# Patient Record
Sex: FEMALE | Race: BLACK OR AFRICAN AMERICAN | Employment: OTHER | URBAN - METROPOLITAN AREA
[De-identification: names, ages, dates, MRNs, and addresses within clinical notes are randomized per-mention and may not be internally consistent; named-entity substitution may affect disease eponyms.]

---

## 2018-02-21 ENCOUNTER — ED HISTORICAL/CONVERTED ENCOUNTER (OUTPATIENT)
Dept: OTHER | Age: 73
End: 2018-02-21

## 2018-08-26 ENCOUNTER — ED HISTORICAL/CONVERTED ENCOUNTER (OUTPATIENT)
Dept: OTHER | Age: 73
End: 2018-08-26

## 2019-02-15 ENCOUNTER — ED HISTORICAL/CONVERTED ENCOUNTER (OUTPATIENT)
Dept: OTHER | Age: 74
End: 2019-02-15

## 2019-03-18 ENCOUNTER — OP HISTORICAL/CONVERTED ENCOUNTER (OUTPATIENT)
Dept: OTHER | Age: 74
End: 2019-03-18

## 2019-04-19 ENCOUNTER — OP HISTORICAL/CONVERTED ENCOUNTER (OUTPATIENT)
Dept: OTHER | Age: 74
End: 2019-04-19

## 2019-08-01 ENCOUNTER — OP HISTORICAL/CONVERTED ENCOUNTER (OUTPATIENT)
Dept: OTHER | Age: 74
End: 2019-08-01

## 2022-08-29 ENCOUNTER — TRANSCRIBE ORDER (OUTPATIENT)
Dept: SCHEDULING | Age: 77
End: 2022-08-29

## 2022-08-29 DIAGNOSIS — R42 DIZZINESS: Primary | ICD-10-CM

## 2022-08-29 DIAGNOSIS — R06.02 SOB (SHORTNESS OF BREATH): ICD-10-CM

## 2022-09-02 ENCOUNTER — HOSPITAL ENCOUNTER (OUTPATIENT)
Dept: VASCULAR SURGERY | Age: 77
Discharge: HOME | End: 2022-09-02
Attending: NURSE PRACTITIONER
Payer: MEDICARE

## 2022-09-02 DIAGNOSIS — R42 DIZZINESS: ICD-10-CM

## 2022-09-02 DIAGNOSIS — R06.02 SOB (SHORTNESS OF BREATH): ICD-10-CM

## 2022-09-02 LAB
ECHO AO ROOT DIAM: 3 CM
ECHO AV AREA PEAK VELOCITY: 2.8 CM2
ECHO AV AREA VTI: 2.7 CM2
ECHO AV MEAN GRADIENT: 3 MMHG
ECHO AV MEAN VELOCITY: 0.8 M/S
ECHO AV PEAK GRADIENT: 5 MMHG
ECHO AV PEAK VELOCITY: 1.1 M/S
ECHO AV VELOCITY RATIO: 0.91
ECHO AV VTI: 27.1 CM
ECHO LA DIAMETER: 2.8 CM
ECHO LA TO AORTIC ROOT RATIO: 0.93
ECHO LA VOL 2C: 30 ML (ref 22–52)
ECHO LA VOL 4C: 38 ML (ref 22–52)
ECHO LA VOL BP: 36 ML (ref 22–52)
ECHO LA VOLUME AREA LENGTH: 41 ML
ECHO LV EJECTION FRACTION A2C: 65 %
ECHO LV EJECTION FRACTION A4C: 69 %
ECHO LV FRACTIONAL SHORTENING: 42 % (ref 28–44)
ECHO LV GLOBAL LONGITUDINAL STRAIN (GLS): -17.4 %
ECHO LV INTERNAL DIMENSION DIASTOLIC: 4.5 CM (ref 3.9–5.3)
ECHO LV INTERNAL DIMENSION SYSTOLIC: 2.6 CM
ECHO LV IVSD: 0.9 CM (ref 0.6–0.9)
ECHO LV MASS 2D: 132.8 G (ref 67–162)
ECHO LV POSTERIOR WALL DIASTOLIC: 0.9 CM (ref 0.6–0.9)
ECHO LV RELATIVE WALL THICKNESS RATIO: 0.4
ECHO LVOT AREA: 3.1 CM2
ECHO LVOT AV VTI INDEX: 0.83
ECHO LVOT CARDIAC OUTPUT: 2.9 LITER/MINUTE
ECHO LVOT DIAM: 2 CM
ECHO LVOT MEAN GRADIENT: 2 MMHG
ECHO LVOT PEAK GRADIENT: 4 MMHG
ECHO LVOT PEAK VELOCITY: 1 M/S
ECHO LVOT SV: 71 ML
ECHO LVOT VTI: 22.6 CM
ECHO MV A VELOCITY: 0.81 M/S
ECHO MV E DECELERATION TIME (DT): 234.4 MS
ECHO MV E VELOCITY: 0.73 M/S
ECHO MV E/A RATIO: 0.9
ECHO RV INTERNAL DIMENSION: 2.1 CM
ECHO RV TAPSE: 1.9 CM (ref 1.7–?)
ECHO TV REGURGITANT MAX VELOCITY: 1.86 M/S
ECHO TV REGURGITANT PEAK GRADIENT: 14 MMHG
LEFT CCA DIST DIAS: 25.9 CM/S
LEFT CCA DIST SYS: 81.2 CM/S
LEFT CCA PROX DIAS: 23.3 CM/S
LEFT CCA PROX SYS: 83.1 CM/S
LEFT ECA DIAS: 7.07 CM/S
LEFT ECA SYS: 46.9 CM/S
LEFT ICA DIST DIAS: 35.5 CM/S
LEFT ICA DIST SYS: 94.3 CM/S
LEFT ICA MID DIAS: 24.3 CM/S
LEFT ICA MID SYS: 80.2 CM/S
LEFT ICA PROX DIAS: 17.7 CM/S
LEFT ICA PROX SYS: 69.5 CM/S
LEFT ICA/CCA SYS: 1.16 NO UNITS
LEFT VERTEBRAL DIAS: 27.01 CM/S
LEFT VERTEBRAL SYS: 72.4 CM/S
RIGHT CCA DIST DIAS: 18.4 CM/S
RIGHT CCA DIST SYS: 83.8 CM/S
RIGHT CCA PROX DIAS: 21.6 CM/S
RIGHT CCA PROX SYS: 93.8 CM/S
RIGHT ECA DIAS: 9.03 CM/S
RIGHT ECA SYS: 56.3 CM/S
RIGHT ICA DIST DIAS: 24.1 CM/S
RIGHT ICA DIST SYS: 60.8 CM/S
RIGHT ICA MID DIAS: 20.2 CM/S
RIGHT ICA MID SYS: 55.5 CM/S
RIGHT ICA PROX DIAS: 17.7 CM/S
RIGHT ICA PROX SYS: 70.2 CM/S
RIGHT ICA/CCA SYS: 0.8 NO UNITS
RIGHT VERTEBRAL DIAS: 13.63 CM/S
RIGHT VERTEBRAL SYS: 46.9 CM/S
VAS LEFT SUBCLAVIAN PROX EDV: 0 CM/S
VAS LEFT SUBCLAVIAN PROX PSV: 69.7 CM/S
VAS RIGHT SUBCLAVIAN PROX EDV: 0 CM/S
VAS RIGHT SUBCLAVIAN PROX PSV: 92.1 CM/S

## 2022-09-02 PROCEDURE — 93306 TTE W/DOPPLER COMPLETE: CPT

## 2022-09-02 PROCEDURE — 93880 EXTRACRANIAL BILAT STUDY: CPT

## 2022-10-25 ENCOUNTER — APPOINTMENT (OUTPATIENT)
Dept: CT IMAGING | Age: 77
End: 2022-10-25
Attending: EMERGENCY MEDICINE
Payer: MEDICARE

## 2022-10-25 ENCOUNTER — HOSPITAL ENCOUNTER (EMERGENCY)
Age: 77
Discharge: ANOTHER HOSPITAL | End: 2022-10-25
Attending: EMERGENCY MEDICINE
Payer: MEDICARE

## 2022-10-25 VITALS
TEMPERATURE: 98 F | HEART RATE: 52 BPM | BODY MASS INDEX: 25.91 KG/M2 | OXYGEN SATURATION: 97 % | RESPIRATION RATE: 16 BRPM | DIASTOLIC BLOOD PRESSURE: 83 MMHG | HEIGHT: 60 IN | WEIGHT: 132 LBS | SYSTOLIC BLOOD PRESSURE: 163 MMHG

## 2022-10-25 DIAGNOSIS — S36.119A: ICD-10-CM

## 2022-10-25 DIAGNOSIS — S22.000D CLOSED COMPRESSION FRACTURE OF THORACIC VERTEBRA WITH ROUTINE HEALING, SUBSEQUENT ENCOUNTER: ICD-10-CM

## 2022-10-25 DIAGNOSIS — S30.0XXA PELVIC CONTUSION, INITIAL ENCOUNTER: ICD-10-CM

## 2022-10-25 DIAGNOSIS — S39.91XA BLUNT ABDOMINAL TRAUMA, INITIAL ENCOUNTER: ICD-10-CM

## 2022-10-25 DIAGNOSIS — S16.1XXA STRAIN OF NECK MUSCLE, INITIAL ENCOUNTER: ICD-10-CM

## 2022-10-25 DIAGNOSIS — S22.41XA CLOSED FRACTURE OF MULTIPLE RIBS OF RIGHT SIDE, INITIAL ENCOUNTER: ICD-10-CM
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
## 2022-10-25 LAB
ALBUMIN SERPL-MCNC: 3.1 G/DL (ref 3.4–5)
ALBUMIN/GLOB SERPL: 0.9 {RATIO} (ref 0.8–1.7)
ALP SERPL-CCNC: 69 U/L (ref 45–117)
ALT SERPL-CCNC: 73 U/L (ref 13–56)
ANION GAP SERPL CALC-SCNC: 4 MMOL/L (ref 3–18)
AST SERPL W P-5'-P-CCNC: 105 U/L (ref 10–38)
BASOPHILS # BLD: 0 K/UL (ref 0–0.1)
BASOPHILS NFR BLD: 1 % (ref 0–2)
BILIRUB SERPL-MCNC: 0.3 MG/DL (ref 0.2–1)
BUN SERPL-MCNC: 19 MG/DL (ref 7–18)
BUN/CREAT SERPL: 16 (ref 12–20)
CA-I BLD-MCNC: 9 MG/DL (ref 8.5–10.1)
CHLORIDE SERPL-SCNC: 108 MMOL/L (ref 100–111)
CO2 SERPL-SCNC: 28 MMOL/L (ref 21–32)
CREAT SERPL-MCNC: 1.16 MG/DL (ref 0.6–1.3)
DIFFERENTIAL METHOD BLD: ABNORMAL
EOSINOPHIL # BLD: 0.1 K/UL (ref 0–0.4)
EOSINOPHIL NFR BLD: 2 % (ref 0–5)
ERYTHROCYTE [DISTWIDTH] IN BLOOD BY AUTOMATED COUNT: 15.9 % (ref 11.6–14.5)
GLOBULIN SER CALC-MCNC: 3.3 G/DL (ref 2–4)
GLUCOSE SERPL-MCNC: 83 MG/DL (ref 74–99)
HCT VFR BLD AUTO: 32.5 % (ref 35–45)
HGB BLD-MCNC: 10.4 G/DL (ref 12–16)
IMM GRANULOCYTES # BLD AUTO: 0 K/UL (ref 0–0.04)
IMM GRANULOCYTES NFR BLD AUTO: 1 % (ref 0–0.5)
LACTATE SERPL-SCNC: 1.5 MMOL/L (ref 0.4–2)
LIPASE SERPL-CCNC: 486 U/L (ref 73–393)
LYMPHOCYTES # BLD: 1.7 K/UL (ref 0.9–3.6)
LYMPHOCYTES NFR BLD: 28 % (ref 21–52)
MCH RBC QN AUTO: 32.3 PG (ref 24–34)
MCHC RBC AUTO-ENTMCNC: 32 G/DL (ref 31–37)
MCV RBC AUTO: 100.9 FL (ref 78–100)
MONOCYTES # BLD: 0.7 K/UL (ref 0.05–1.2)
MONOCYTES NFR BLD: 11 % (ref 3–10)
NEUTS SEG # BLD: 3.6 K/UL (ref 1.8–8)
NEUTS SEG NFR BLD: 57 % (ref 40–73)
NRBC # BLD: 0 K/UL (ref 0–0.01)
NRBC BLD-RTO: 0 PER 100 WBC
PLATELET # BLD AUTO: 229 K/UL (ref 135–420)
PMV BLD AUTO: 9.8 FL (ref 9.2–11.8)
POTASSIUM SERPL-SCNC: 4.2 MMOL/L (ref 3.5–5.5)
PROT SERPL-MCNC: 6.4 G/DL (ref 6.4–8.2)
RBC # BLD AUTO: 3.22 M/UL (ref 4.2–5.3)
SODIUM SERPL-SCNC: 140 MMOL/L (ref 136–145)
TROPONIN-HIGH SENSITIVITY: 12 NG/L (ref 0–54)
WBC # BLD AUTO: 6.1 K/UL (ref 4.6–13.2)

## 2022-10-25 PROCEDURE — 74011250636 HC RX REV CODE- 250/636: Performed by: EMERGENCY MEDICINE

## 2022-10-25 PROCEDURE — 72125 CT NECK SPINE W/O DYE: CPT

## 2022-10-25 PROCEDURE — 99285 EMERGENCY DEPT VISIT HI MDM: CPT

## 2022-10-25 PROCEDURE — 83690 ASSAY OF LIPASE: CPT

## 2022-10-25 PROCEDURE — 80053 COMPREHEN METABOLIC PANEL: CPT

## 2022-10-25 PROCEDURE — 71260 CT THORAX DX C+: CPT

## 2022-10-25 PROCEDURE — 85025 COMPLETE CBC W/AUTO DIFF WBC: CPT

## 2022-10-25 PROCEDURE — 96376 TX/PRO/DX INJ SAME DRUG ADON: CPT

## 2022-10-25 PROCEDURE — 74011000636 HC RX REV CODE- 636: Performed by: EMERGENCY MEDICINE

## 2022-10-25 PROCEDURE — 84484 ASSAY OF TROPONIN QUANT: CPT

## 2022-10-25 PROCEDURE — 70450 CT HEAD/BRAIN W/O DYE: CPT

## 2022-10-25 PROCEDURE — 96374 THER/PROPH/DIAG INJ IV PUSH: CPT

## 2022-10-25 PROCEDURE — 83605 ASSAY OF LACTIC ACID: CPT

## 2022-10-25 PROCEDURE — 74011000250 HC RX REV CODE- 250: Performed by: EMERGENCY MEDICINE

## 2022-10-25 RX ORDER — MORPHINE SULFATE 2 MG/ML
1 INJECTION, SOLUTION INTRAMUSCULAR; INTRAVENOUS
Status: COMPLETED | OUTPATIENT
Start: 2022-10-25 | End: 2022-10-25

## 2022-10-25 RX ORDER — MORPHINE SULFATE 2 MG/ML
2 INJECTION, SOLUTION INTRAMUSCULAR; INTRAVENOUS
Status: COMPLETED | OUTPATIENT
Start: 2022-10-25 | End: 2022-10-25

## 2022-10-25 RX ORDER — SODIUM CHLORIDE 9 MG/ML
75 INJECTION, SOLUTION INTRAVENOUS CONTINUOUS
Status: DISCONTINUED | OUTPATIENT
Start: 2022-10-25 | End: 2022-10-25 | Stop reason: HOSPADM

## 2022-10-25 RX ORDER — SODIUM CHLORIDE 0.9 % (FLUSH) 0.9 %
5-10 SYRINGE (ML) INJECTION
Status: COMPLETED | OUTPATIENT
Start: 2022-10-25 | End: 2022-10-25

## 2022-10-25 RX ADMIN — MORPHINE SULFATE 2 MG: 2 INJECTION, SOLUTION INTRAMUSCULAR; INTRAVENOUS at 13:43

## 2022-10-25 RX ADMIN — Medication 10 ML: at 10:24

## 2022-10-25 RX ADMIN — MORPHINE SULFATE 1 MG: 2 INJECTION, SOLUTION INTRAMUSCULAR; INTRAVENOUS at 10:15

## 2022-10-25 RX ADMIN — IOPAMIDOL 96 ML: 755 INJECTION, SOLUTION INTRAVENOUS at 10:40
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
## 2022-10-25 NOTE — ED NOTES
Report called to [facility] ED, Chevy Pineda. All questions and concerns addressed.

## 2022-10-25 NOTE — ED NOTES
Patient and  advised that patient is being transferred to [facility] for increased level of care and specialty services not available at this facility. Both state understanding.
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
## 2023-01-30 ENCOUNTER — HOSPITAL ENCOUNTER (EMERGENCY)
Age: 78
Discharge: HOME | End: 2023-01-30
Attending: EMERGENCY MEDICINE
Payer: MEDICARE

## 2023-01-30 VITALS
DIASTOLIC BLOOD PRESSURE: 95 MMHG | HEART RATE: 85 BPM | TEMPERATURE: 98 F | RESPIRATION RATE: 18 BRPM | WEIGHT: 112 LBS | HEIGHT: 60 IN | SYSTOLIC BLOOD PRESSURE: 120 MMHG | OXYGEN SATURATION: 99 % | BODY MASS INDEX: 21.99 KG/M2

## 2023-01-30 DIAGNOSIS — M62.838 NECK MUSCLE SPASM: Primary | ICD-10-CM

## 2023-01-30 PROCEDURE — 99283 EMERGENCY DEPT VISIT LOW MDM: CPT

## 2023-01-30 PROCEDURE — 74011250637 HC RX REV CODE- 250/637: Performed by: EMERGENCY MEDICINE

## 2023-01-30 RX ORDER — ACETAMINOPHEN 500 MG
1000 TABLET ORAL ONCE
Status: COMPLETED | OUTPATIENT
Start: 2023-01-30 | End: 2023-01-30

## 2023-01-30 RX ORDER — IBUPROFEN 600 MG/1
600 TABLET ORAL
Status: DISCONTINUED | OUTPATIENT
Start: 2023-01-30 | End: 2023-01-30

## 2023-01-30 RX ORDER — CYCLOBENZAPRINE HCL 10 MG
10 TABLET ORAL
Qty: 15 TABLET | Refills: 0 | Status: SHIPPED | OUTPATIENT
Start: 2023-01-30 | End: 2023-02-04

## 2023-01-30 RX ORDER — ACETAMINOPHEN 325 MG/1
650 TABLET ORAL
Qty: 20 TABLET | Refills: 0 | Status: SHIPPED | OUTPATIENT
Start: 2023-01-30

## 2023-01-30 RX ADMIN — ACETAMINOPHEN 1000 MG: 500 TABLET ORAL at 14:44

## 2023-01-30 NOTE — ED PROVIDER NOTES
[facility] EMERGENCY DEPT  EMERGENCY DEPARTMENT HISTORY AND PHYSICAL EXAM      Date: 1/30/2023  Patient Name: Abner Figueroa  MRN: [id number]  [place]: 1945  Date of evaluation: 1/30/2023  Provider: Arnav Ruiz MD   Note Started: 1:54 PM 1/30/23    HISTORY OF PRESENT ILLNESS     Chief Complaint   Patient presents with    Neck Pain       History Provided By: Patient    HPI: Abner Figueroa, 78 y.o. female complaining of neck pain she awoke with this morning, patient denies any trauma    PAST MEDICAL HISTORY   Past Medical History:  Past Medical History:   Diagnosis Date    Asthma     Cancer [facility])        Past Surgical History:  No past surgical history on file. Family History:  History reviewed. No pertinent family history. Social History:  Social History     Tobacco Use    Smoking status: Every Day     Packs/day: 0.50     Types: Cigarettes    Smokeless tobacco: Never   Substance Use Topics    Alcohol use: Never    Drug use: Yes     Types: Marijuana       Allergies:  No Known Allergies    PCP: Giacomo Niño MD    Current Meds:   Previous Medications    No medications on file       REVIEW OF SYSTEMS   Review of Systems   Constitutional:  Negative for chills and fever. HENT:  Negative for rhinorrhea and sore throat. Eyes:  Negative for pain and visual disturbance. Respiratory:  Negative for cough and shortness of breath. Cardiovascular:  Negative for chest pain and leg swelling. Gastrointestinal:  Negative for abdominal pain and vomiting. Endocrine: Negative for polydipsia and polyuria. Genitourinary:  Negative for dysuria and hematuria. Musculoskeletal:  Positive for neck pain. Negative for back pain and myalgias. Skin:  Negative for color change and pallor. Neurological:  Negative for weakness and headaches. Psychiatric/Behavioral:  Negative for agitation and suicidal ideas. Positives and Pertinent negatives as per HPI.     PHYSICAL EXAM     ED Triage Vitals [01/30/23 97 [id number] ED Encounter Vitals Group      BP (!) 131/95      Pulse (Heart Rate) 83      Resp Rate 19      Temp 98 °F (36.7 °C)      Temp src       O2 Sat (%) 99 %      Weight 112 lb      Height 5'      Physical Exam  Vitals and nursing note reviewed. Constitutional:       General: She is not in acute distress. Appearance: She is not ill-appearing, toxic-appearing or diaphoretic. HENT:      Head: Normocephalic and atraumatic. Right Ear: Tympanic membrane normal.      Left Ear: Tympanic membrane normal.      Nose: Nose normal. No congestion. Mouth/Throat:      Mouth: Mucous membranes are moist.      Pharynx: Oropharynx is clear. Eyes:      Extraocular Movements: Extraocular movements intact. Conjunctiva/sclera: Conjunctivae normal.      Pupils: Pupils are equal, round, and reactive to light. Neck:     Cardiovascular:      Rate and Rhythm: Normal rate and regular rhythm. Pulses: Normal pulses. Heart sounds: Normal heart sounds. Pulmonary:      Effort: Pulmonary effort is normal.      Breath sounds: Normal breath sounds. Abdominal:      General: Bowel sounds are normal.      Palpations: Abdomen is soft. Tenderness: There is no abdominal tenderness. Musculoskeletal:         General: Tenderness present. No deformity or signs of injury. Normal range of motion. Cervical back: Normal range of motion and neck supple. No signs of trauma, rigidity, torticollis, tenderness or crepitus. Pain with movement and muscular tenderness present. No spinous process tenderness. Normal range of motion. Lymphadenopathy:      Cervical: No cervical adenopathy. Skin:     General: Skin is warm and dry. Capillary Refill: Capillary refill takes less than 2 seconds. Findings: No rash. Neurological:      General: No focal deficit present. Mental Status: She is alert and oriented to person, place, and time. Cranial Nerves: No cranial nerve deficit. Sensory: No sensory deficit. Psychiatric:         Mood and Affect: Mood normal.         Behavior: Behavior normal.       SCREENINGS               No data recorded        LAB, EKG AND DIAGNOSTIC RESULTS   Labs:  No results found for this or any previous visit (from the past 12 hour(s)). Radiologic Studies:  Non-plain film images such as CT, Ultrasound and MRI are read by the radiologist. Plain radiographic images are visualized and preliminarily interpreted by the ED Provider with the below findings:    *    Interpretation per the Radiologist below, if available at the time of this note:  No results found. PROCEDURES   Unless otherwise noted below, none. Performed by: Emma Zepeda MD   Procedures      CRITICAL CARE TIME       ED COURSE and DIFFERENTIAL DIAGNOSIS/MDM   Vitals:    Vitals:    01/30/23 1314   BP: (!) 131/95   Pulse: 83   Resp: 19   Temp: 98 °F (36.7 °C)   SpO2: 99%   Weight: 50.8 kg (112 lb)   Height: 5' (1.524 m)        Patient was given the following medications:  Medications - No data to display    CONSULTS: (Who and What was discussed)  None     Chronic Conditions: Asthma, multiple myeloma  Social Determinants affecting Dx or Tx: None  Counseling: TOBACCO COUNSELING: Upon evaluation, pt expressed that they are a current tobacco user. For approximately 10 minutes, pt has been counseled on the dangers of smoking and was encouraged to quit as soon as possible in order to decrease further risks to their health. Pt has conveyed their understanding of the risks involved should they continue to use tobacco products.      Records Reviewed (source and summary of external notes): Prior medical records    CC/HPI Summary, DDx, ED Course, and Reassessment: Patient presents with complaint of bilateral neck pain patient states she awoke this morning with the pain, pain is described as achy with pain intensity of 8/10 worsened with movement, patient denies any fever or chills    Patient states she took a muscle relaxant earlier this morning    DDx meningitis, closed fracture, ligamentous injury    Patient given acetaminophen 1000 mg p.o. for pain, and patient stated pain has improved           Disposition Considerations (Tests not done, Shared Decision Making, Pt Expectation of Test or Treatment.):  Discharged to home with improvement in pain, patient prescribed Flexeril 10 mg to take for neck spasms    FINAL IMPRESSION   No diagnosis found. DISPOSITION/PLAN       Discharge Note: The patient is stable for discharge home. The signs, symptoms, diagnosis, and discharge instructions have been discussed, understanding conveyed, and agreed upon. The patient is to follow up as recommended or return to ER should their symptoms worsen. PATIENT REFERRED TO:  Follow-up Information    None           DISCHARGE MEDICATIONS:  There are no discharge medications for this patient. DISCONTINUED MEDICATIONS:  There are no discharge medications for this patient. I am the Primary Clinician of Record: Oniel Sanchez MD (electronically signed)    (Please note that parts of this dictation were completed with voice recognition software. Quite often unanticipated grammatical, syntax, homophones, and other interpretive errors are inadvertently transcribed by the computer software. Please disregards these errors.  Please excuse any errors that have escaped final proofreading.)

## 2023-01-30 NOTE — ED TRIAGE NOTES
Pt reports she woke up with neck pain. Pain is worse with movement. Denies any known injury or trauma.

## 2024-01-25 ENCOUNTER — ANESTHESIA EVENT (OUTPATIENT)
Facility: HOSPITAL | Age: 79
End: 2024-01-25
Payer: MEDICARE

## 2024-01-25 ENCOUNTER — ANESTHESIA (OUTPATIENT)
Facility: HOSPITAL | Age: 79
End: 2024-01-25
Payer: MEDICARE

## 2024-01-25 ENCOUNTER — HOSPITAL ENCOUNTER (OUTPATIENT)
Facility: HOSPITAL | Age: 79
Setting detail: OUTPATIENT SURGERY
Discharge: HOME | End: 2024-01-25
Attending: INTERNAL MEDICINE | Admitting: INTERNAL MEDICINE
Payer: MEDICARE

## 2024-01-25 VITALS
HEIGHT: 60 IN | WEIGHT: 136.24 LBS | DIASTOLIC BLOOD PRESSURE: 77 MMHG | OXYGEN SATURATION: 100 % | BODY MASS INDEX: 26.75 KG/M2 | SYSTOLIC BLOOD PRESSURE: 138 MMHG | TEMPERATURE: 98.6 F | HEART RATE: 60 BPM | RESPIRATION RATE: 20 BRPM

## 2024-01-25 LAB — POTASSIUM SERPL-SCNC: 4.4 MMOL/L (ref 3.5–5.1)

## 2024-01-25 PROCEDURE — 2500000003 HC RX 250 WO HCPCS: Performed by: NURSE ANESTHETIST, CERTIFIED REGISTERED

## 2024-01-25 PROCEDURE — 3700000001 HC ADD 15 MINUTES (ANESTHESIA): Performed by: INTERNAL MEDICINE

## 2024-01-25 PROCEDURE — 84132 ASSAY OF SERUM POTASSIUM: CPT

## 2024-01-25 PROCEDURE — 7100000010 HC PHASE II RECOVERY - FIRST 15 MIN: Performed by: INTERNAL MEDICINE

## 2024-01-25 PROCEDURE — 3600007512: Performed by: INTERNAL MEDICINE

## 2024-01-25 PROCEDURE — 2709999900 HC NON-CHARGEABLE SUPPLY: Performed by: INTERNAL MEDICINE

## 2024-01-25 PROCEDURE — 36415 COLL VENOUS BLD VENIPUNCTURE: CPT

## 2024-01-25 PROCEDURE — 6360000002 HC RX W HCPCS: Performed by: NURSE ANESTHETIST, CERTIFIED REGISTERED

## 2024-01-25 PROCEDURE — 7100000011 HC PHASE II RECOVERY - ADDTL 15 MIN: Performed by: INTERNAL MEDICINE

## 2024-01-25 PROCEDURE — 3700000000 HC ANESTHESIA ATTENDED CARE: Performed by: INTERNAL MEDICINE

## 2024-01-25 PROCEDURE — 2580000003 HC RX 258: Performed by: NURSE ANESTHETIST, CERTIFIED REGISTERED

## 2024-01-25 PROCEDURE — 3600007502: Performed by: INTERNAL MEDICINE

## 2024-01-25 RX ORDER — HYDROCHLOROTHIAZIDE 50 MG/1
50 TABLET ORAL DAILY
COMMUNITY

## 2024-01-25 RX ORDER — ACYCLOVIR 200 MG/1
200 CAPSULE ORAL DAILY
COMMUNITY

## 2024-01-25 RX ORDER — POMALIDOMIDE 2 MG/1
2 CAPSULE ORAL DAILY
COMMUNITY
Start: 2022-11-03

## 2024-01-25 RX ORDER — ESCITALOPRAM OXALATE 10 MG/1
10 TABLET ORAL AS NEEDED
COMMUNITY

## 2024-01-25 RX ORDER — LISINOPRIL 20 MG/1
20 TABLET ORAL DAILY
COMMUNITY

## 2024-01-25 RX ORDER — CHLORTHALIDONE 25 MG/1
25 TABLET ORAL DAILY
COMMUNITY
Start: 2018-07-20

## 2024-01-25 RX ORDER — SODIUM CHLORIDE 0.9 % (FLUSH) 0.9 %
5-40 SYRINGE (ML) INJECTION PRN
Status: CANCELLED | OUTPATIENT
Start: 2024-01-25

## 2024-01-25 RX ORDER — DEXMEDETOMIDINE HYDROCHLORIDE 100 UG/ML
INJECTION, SOLUTION INTRAVENOUS PRN
Status: DISCONTINUED | OUTPATIENT
Start: 2024-01-25 | End: 2024-01-25 | Stop reason: SDUPTHER

## 2024-01-25 RX ORDER — ASPIRIN 81 MG/1
81 TABLET ORAL DAILY
COMMUNITY
Start: 2013-12-19

## 2024-01-25 RX ORDER — ALBUTEROL SULFATE 90 UG/1
1 AEROSOL, METERED RESPIRATORY (INHALATION) EVERY 6 HOURS PRN
COMMUNITY
Start: 2021-11-03

## 2024-01-25 RX ORDER — PROPOFOL 10 MG/ML
INJECTION, EMULSION INTRAVENOUS CONTINUOUS PRN
Status: DISCONTINUED | OUTPATIENT
Start: 2024-01-25 | End: 2024-01-25 | Stop reason: SDUPTHER

## 2024-01-25 RX ORDER — CYCLOBENZAPRINE HCL 5 MG
5 TABLET ORAL 2 TIMES DAILY PRN
COMMUNITY
Start: 2023-02-01

## 2024-01-25 RX ORDER — SODIUM CHLORIDE 0.9 % (FLUSH) 0.9 %
5-40 SYRINGE (ML) INJECTION EVERY 12 HOURS SCHEDULED
Status: CANCELLED | OUTPATIENT
Start: 2024-01-25

## 2024-01-25 RX ORDER — FOSINOPRIL SODIUM 10 MG/1
20 TABLET ORAL DAILY
COMMUNITY

## 2024-01-25 RX ORDER — PANTOPRAZOLE SODIUM 40 MG/1
40 TABLET, DELAYED RELEASE ORAL DAILY
COMMUNITY
Start: 2021-10-01

## 2024-01-25 RX ORDER — ONDANSETRON 4 MG/1
4 TABLET, FILM COATED ORAL ONCE
COMMUNITY
Start: 2019-03-16

## 2024-01-25 RX ORDER — ATORVASTATIN CALCIUM 20 MG/1
20 TABLET, FILM COATED ORAL DAILY
COMMUNITY
Start: 2015-05-07

## 2024-01-25 RX ORDER — CYANOCOBALAMIN 1000 UG/ML
1000 INJECTION, SOLUTION INTRAMUSCULAR; SUBCUTANEOUS ONCE
COMMUNITY

## 2024-01-25 RX ORDER — SUCRALFATE 1 G/1
1 TABLET ORAL 4 TIMES DAILY
COMMUNITY
Start: 2022-04-28

## 2024-01-25 RX ORDER — SERTRALINE HYDROCHLORIDE 100 MG/1
100 TABLET, FILM COATED ORAL DAILY
COMMUNITY
Start: 2022-04-10

## 2024-01-25 RX ORDER — OMEPRAZOLE 40 MG/1
40 CAPSULE, DELAYED RELEASE ORAL DAILY
COMMUNITY
Start: 2019-03-28

## 2024-01-25 RX ORDER — SODIUM CHLORIDE 9 MG/ML
25 INJECTION, SOLUTION INTRAVENOUS PRN
Status: CANCELLED | OUTPATIENT
Start: 2024-01-25

## 2024-01-25 RX ORDER — SODIUM CHLORIDE 9 MG/ML
INJECTION, SOLUTION INTRAVENOUS CONTINUOUS PRN
Status: DISCONTINUED | OUTPATIENT
Start: 2024-01-25 | End: 2024-01-25 | Stop reason: SDUPTHER

## 2024-01-25 RX ORDER — ZINC GLUCONATE 50 MG
50 TABLET ORAL DAILY
COMMUNITY

## 2024-01-25 RX ORDER — MULTIVIT-MIN/FERROUS SULFATE 4.5 MG
1 TABLET ORAL DAILY
COMMUNITY

## 2024-01-25 RX ADMIN — PROPOFOL 500 MCG/KG/MIN: 10 INJECTION, EMULSION INTRAVENOUS at 09:14

## 2024-01-25 RX ADMIN — SODIUM CHLORIDE: 9 INJECTION, SOLUTION INTRAVENOUS at 09:11

## 2024-01-25 RX ADMIN — DEXMEDETOMIDINE 3 MCG: 100 INJECTION, SOLUTION INTRAVENOUS at 09:13

## 2024-01-25 RX ADMIN — LIDOCAINE HYDROCHLORIDE 60 MG: 20 INJECTION, SOLUTION INFILTRATION; PERINEURAL at 09:14

## 2024-01-25 ASSESSMENT — PAIN SCALES - GENERAL
PAINLEVEL_OUTOF10: 0

## 2024-01-25 ASSESSMENT — PAIN - FUNCTIONAL ASSESSMENT: PAIN_FUNCTIONAL_ASSESSMENT: 0-10

## 2024-01-25 ASSESSMENT — LIFESTYLE VARIABLES: SMOKING_STATUS: 1

## 2024-01-25 NOTE — PROCEDURES
[facility]  Tulio Gamboa M.D.  [phone]           2024                EGD Operative Report  Brenda Olivas  :  1945  [facility] Medical Record Number:  [id number]      Indication: Dyspepsia and h/o GBP surgery     : Tulio Gamboa MD    Assistant -- None  Implants -- None    Referring Provider:  Aida Frazier MD      Anesthesia/Sedation:  MAC anesthesia Propofol    Airway assessment: No airway problems anticipated    Pre-Procedural Exam:      Airway: clear, no airway problems anticipated  Heart: RRR, without gallops or rubs  Lungs: clear bilaterally without wheezes, crackles, or rhonchi  Abdomen: soft, nontender, nondistended, bowel sounds present  Mental Status: awake, alert and oriented to person, place and time       Procedure Details     After infomed consent was obtained for the procedure, with all risks and benefits of procedure explained the patient was taken to the endoscopy suite and placed in the left lateral decubitus position.  Following sequential administration of sedation as per above, the endoscope was inserted into the mouth and advanced under direct vision to second portion of the duodenum.  A careful inspection was made as the gastroscope was withdrawn, including a retroflexed view of the proximal stomach; findings and interventions are described below.      Findings:   Esophagus:normal  Stomach: evidence of gastric bypass noted. There was slight stenosis noted at the GJ-junction. It appears there is dehiscence of the anasomosis with communication noted now between the anastomosis and remnant stomach. Likely the cause of her symptoms  Duodenum/jejunum: normal    Therapies:  none    Specimens: none           Complications:   None; patient tolerated the procedure well.    EBL:  None.           Impression:    Dehiscence noted between the gastric pouch and remnant stomach       Recommendations:    -case discussed with Dr Tipton. Please follow up with

## 2024-01-25 NOTE — PROGRESS NOTES
Brenda Olivas  1945  [id number]    Situation:  Verbal report received from: NIKO Santana   Procedure: Procedure(s):  EGD BIOPSY    Background:    Preoperative diagnosis: Dyspepsia [R10.13]  Colon cancer screening [Z12.11]  Postoperative diagnosis: * No post-op diagnosis entered *    :  Dr. Gamboa   Assistant(s): Circulator: Esther Fragoso RN  Circulator Assist: Pushpa Camara RN    Specimens: * No specimens in log *  H. Pylori    no    Assessment:  Intra-procedure medications     Anesthesia gave intra-procedure sedation and medications, see anesthesia flow sheet   yes    Intravenous fluids: NS@ KVO     Vital signs stable   yes    Abdominal assessment: round and soft   yes    Recommendation:  Discharge patient per MD order  yes.  Return to floor  outpatient  Family or Friend   spouse  Permission to share finding with family or friend   yes

## 2024-01-25 NOTE — PERIOP NOTE
0910   Patient has been evaluated by anesthesia pre-procedure.    Patient alert and oriented.     Vital signs will not be charted by the Endoscopy nurse.     All vitals, airway, and loc are monitored by anesthesia staff, Ilia, throughout procedure.         0925   Endoscope was pre-cleaned at bedside immediately following procedure by endo Jonathan murdock.      0930   Patient tolerated procedure.   Abdomen soft and patient arousable and voices no complaints.   Patient transported to endoscopy recovery area.   Report given to post procedure Betzaida POPE.

## 2024-01-25 NOTE — PROGRESS NOTES
Endoscopy discharge instructions have been reviewed and given to patient.  The patient verbalized understanding and acceptance of instructions.      Dr. Gamboa discussed with patient procedure findings and next steps.

## 2024-01-25 NOTE — DISCHARGE INSTRUCTIONS
[facility]  Tulio Gamboa M.D.  [phone]         EGD DISCHARGE INSTRUCTIONS      Brenda Olivas  [id number]  1945    DISCOMFORT:  Sore throat- throat lozenges or warm salt water gargle  Redness at IV site- apply warm compress to area; if redness or soreness persist- contact your physician  Gaseous discomfort- walking, belching will help relieve any discomfort  You may not operate a vehicle for 12 hours  You may not engage in an occupation involving machinery or appliances for the  rest of today  You may not drink alcoholic beverages for at least 12 hours  Avoid making any critical decisions for at least 24 hours    DIET:   You may resume your normal diet, but some patients find that heavy or large  meals may lead to indigestion or vomiting. I suggest a light meal as first food  intake.    ACTIVITY:  You may resume your normal daily activities.  It is recommended that you spend the remainder of the day resting - avoid any strenuous activity.    CALL M.DKarol IF ANY SIGN OF:   Increasing pain, nausea, vomiting  Abdominal distension (swelling)  Significant bleeding (oral or rectal)  Fever   Pain in chest area  Shortness of breath    Additional Instructions:   Call Dr. Gamboa if any questions or problems at [phone]   There is dehiscence of the gastric bypass anastomosis. Please follow up with Dr. Painter for further treatment

## 2024-01-25 NOTE — ANESTHESIA POSTPROCEDURE EVALUATION
Department of Anesthesiology  Postprocedure Note    Patient: Brenda Olivas  MRN: [id number]  YOB: 1945  Date of evaluation: 1/25/2024    Procedure Summary       Date: 01/25/24 Room / Location: [place] [address] / [facility] ENDOSCOPY    Anesthesia Start: 0911 Anesthesia Stop: 0929    Procedure: EGD BIOPSY (Upper GI Region) Diagnosis:       Dyspepsia      Colon cancer screening      (Dyspepsia [R10.13])      (Colon cancer screening [Z12.11])    Surgeons: Tulio Gamboa MD Responsible Provider: Sebastien Ortiz MD    Anesthesia Type: MAC ASA Status: 3            Anesthesia Type: No value filed.    Sergio Phase I:      Sergio Phase II: Sergio Score: 10    Anesthesia Post Evaluation    Patient location during evaluation: PACU  Patient participation: complete - patient participated  Level of consciousness: awake and alert and awake  Pain score: 0  Nausea & Vomiting: no vomiting and no nausea  Cardiovascular status: hemodynamically stable  Respiratory status: room air  Hydration status: stable  There was medical reason for not using a multimodal analgesia pain management approach.    No notable events documented.

## 2024-01-25 NOTE — ANESTHESIA PRE PROCEDURE
discussed with patient whom.   Plan discussed with CRNA.                    Sebastien Ortiz MD   1/25/2024

## 2024-07-22 ENCOUNTER — HOSPITAL ENCOUNTER (EMERGENCY)
Facility: HOSPITAL | Age: 79
Discharge: HOME | End: 2024-07-22
Attending: STUDENT IN AN ORGANIZED HEALTH CARE EDUCATION/TRAINING PROGRAM
Payer: MEDICARE

## 2024-07-22 ENCOUNTER — APPOINTMENT (OUTPATIENT)
Facility: HOSPITAL | Age: 79
End: 2024-07-22
Payer: MEDICARE

## 2024-07-22 VITALS
RESPIRATION RATE: 17 BRPM | DIASTOLIC BLOOD PRESSURE: 75 MMHG | OXYGEN SATURATION: 100 % | HEIGHT: 60 IN | TEMPERATURE: 98.5 F | WEIGHT: 126 LBS | SYSTOLIC BLOOD PRESSURE: 138 MMHG | HEART RATE: 63 BPM | BODY MASS INDEX: 24.74 KG/M2

## 2024-07-22 DIAGNOSIS — E86.0 DEHYDRATION: Primary | ICD-10-CM

## 2024-07-22 LAB
ALBUMIN SERPL-MCNC: 2.2 G/DL (ref 3.5–5)
ALBUMIN/GLOB SERPL: 0.8 (ref 1.1–2.2)
ALP SERPL-CCNC: 87 U/L (ref 45–117)
ALT SERPL-CCNC: 20 U/L (ref 12–78)
ANION GAP SERPL CALC-SCNC: 1 MMOL/L (ref 5–15)
APPEARANCE UR: CLEAR
AST SERPL W P-5'-P-CCNC: 11 U/L (ref 15–37)
BACTERIA URNS QL MICRO: NEGATIVE /HPF
BASOPHILS # BLD: 0 K/UL (ref 0–0.1)
BASOPHILS NFR BLD: 1 % (ref 0–1)
BILIRUB SERPL-MCNC: 0.5 MG/DL (ref 0.2–1)
BILIRUB UR QL: NEGATIVE
BUN SERPL-MCNC: 33 MG/DL (ref 6–20)
BUN/CREAT SERPL: 15 (ref 12–20)
CA-I BLD-MCNC: 7.6 MG/DL (ref 8.5–10.1)
CHLORIDE SERPL-SCNC: 114 MMOL/L (ref 97–108)
CO2 SERPL-SCNC: 24 MMOL/L (ref 21–32)
COLOR UR: ABNORMAL
CREAT SERPL-MCNC: 2.25 MG/DL (ref 0.55–1.02)
DIFFERENTIAL METHOD BLD: ABNORMAL
EKG ATRIAL RATE: 65 BPM
EKG DIAGNOSIS: NORMAL
EKG P-R INTERVAL: 142 MS
EKG Q-T INTERVAL: 396 MS
EKG QRS DURATION: 70 MS
EKG QTC CALCULATION (BAZETT): 411 MS
EKG R AXIS: -10 DEGREES
EKG T AXIS: 53 DEGREES
EKG VENTRICULAR RATE: 65 BPM
EOSINOPHIL # BLD: 0.1 K/UL (ref 0–0.4)
EOSINOPHIL NFR BLD: 2 % (ref 0–7)
EPITH CASTS URNS QL MICRO: ABNORMAL /LPF
ERYTHROCYTE [DISTWIDTH] IN BLOOD BY AUTOMATED COUNT: 17.6 % (ref 11.5–14.5)
GLOBULIN SER CALC-MCNC: 2.6 G/DL (ref 2–4)
GLUCOSE SERPL-MCNC: 74 MG/DL (ref 65–100)
GLUCOSE UR STRIP.AUTO-MCNC: NEGATIVE MG/DL
HCT VFR BLD AUTO: 25.3 % (ref 35–47)
HGB BLD-MCNC: 8.4 G/DL (ref 11.5–16)
HGB UR QL STRIP: NEGATIVE
IMM GRANULOCYTES # BLD AUTO: 0 K/UL (ref 0–0.04)
IMM GRANULOCYTES NFR BLD AUTO: 1 % (ref 0–0.5)
KETONES UR QL STRIP.AUTO: NEGATIVE MG/DL
LACTATE SERPL-SCNC: 1.5 MMOL/L (ref 0.4–2)
LEUKOCYTE ESTERASE UR QL STRIP.AUTO: ABNORMAL
LYMPHOCYTES # BLD: 1.7 K/UL (ref 0.8–3.5)
LYMPHOCYTES NFR BLD: 34 % (ref 12–49)
MCH RBC QN AUTO: 34.1 PG (ref 26–34)
MCHC RBC AUTO-ENTMCNC: 33.2 G/DL (ref 30–36.5)
MCV RBC AUTO: 102.8 FL (ref 80–99)
MONOCYTES # BLD: 0.6 K/UL (ref 0–1)
MONOCYTES NFR BLD: 11 % (ref 5–13)
NEUTS SEG # BLD: 2.6 K/UL (ref 1.8–8)
NEUTS SEG NFR BLD: 51 % (ref 32–75)
NITRITE UR QL STRIP.AUTO: NEGATIVE
NRBC # BLD: 0 K/UL (ref 0–0.01)
NRBC BLD-RTO: 0 PER 100 WBC
PH UR STRIP: 7 (ref 5–8)
PLATELET # BLD AUTO: 231 K/UL (ref 150–400)
PMV BLD AUTO: 10.8 FL (ref 8.9–12.9)
POTASSIUM SERPL-SCNC: 4.7 MMOL/L (ref 3.5–5.1)
PROT SERPL-MCNC: 4.8 G/DL (ref 6.4–8.2)
PROT UR STRIP-MCNC: 30 MG/DL
RBC # BLD AUTO: 2.46 M/UL (ref 3.8–5.2)
RBC #/AREA URNS HPF: ABNORMAL /HPF (ref 0–5)
SODIUM SERPL-SCNC: 139 MMOL/L (ref 136–145)
SP GR UR REFRACTOMETRY: 1.01 (ref 1–1.03)
TROPONIN I SERPL HS-MCNC: 11 NG/L (ref 0–51)
UROBILINOGEN UR QL STRIP.AUTO: 0.1 EU/DL (ref 0.1–1)
WBC # BLD AUTO: 5 K/UL (ref 3.6–11)
WBC URNS QL MICRO: ABNORMAL /HPF (ref 0–4)

## 2024-07-22 PROCEDURE — 96361 HYDRATE IV INFUSION ADD-ON: CPT

## 2024-07-22 PROCEDURE — 99285 EMERGENCY DEPT VISIT HI MDM: CPT

## 2024-07-22 PROCEDURE — 84484 ASSAY OF TROPONIN QUANT: CPT

## 2024-07-22 PROCEDURE — 85025 COMPLETE CBC W/AUTO DIFF WBC: CPT

## 2024-07-22 PROCEDURE — 93005 ELECTROCARDIOGRAM TRACING: CPT | Performed by: STUDENT IN AN ORGANIZED HEALTH CARE EDUCATION/TRAINING PROGRAM

## 2024-07-22 PROCEDURE — 87040 BLOOD CULTURE FOR BACTERIA: CPT

## 2024-07-22 PROCEDURE — 96374 THER/PROPH/DIAG INJ IV PUSH: CPT

## 2024-07-22 PROCEDURE — 2580000003 HC RX 258: Performed by: STUDENT IN AN ORGANIZED HEALTH CARE EDUCATION/TRAINING PROGRAM

## 2024-07-22 PROCEDURE — 80053 COMPREHEN METABOLIC PANEL: CPT

## 2024-07-22 PROCEDURE — 81001 URINALYSIS AUTO W/SCOPE: CPT

## 2024-07-22 PROCEDURE — 6360000002 HC RX W HCPCS: Performed by: STUDENT IN AN ORGANIZED HEALTH CARE EDUCATION/TRAINING PROGRAM

## 2024-07-22 PROCEDURE — 36415 COLL VENOUS BLD VENIPUNCTURE: CPT

## 2024-07-22 PROCEDURE — 83605 ASSAY OF LACTIC ACID: CPT

## 2024-07-22 PROCEDURE — 71046 X-RAY EXAM CHEST 2 VIEWS: CPT

## 2024-07-22 RX ORDER — 0.9 % SODIUM CHLORIDE 0.9 %
1000 INTRAVENOUS SOLUTION INTRAVENOUS ONCE
Status: COMPLETED | OUTPATIENT
Start: 2024-07-22 | End: 2024-07-22

## 2024-07-22 RX ADMIN — CEFTRIAXONE SODIUM 1000 MG: 1 INJECTION, POWDER, FOR SOLUTION INTRAMUSCULAR; INTRAVENOUS at 13:20

## 2024-07-22 RX ADMIN — SODIUM CHLORIDE 1000 ML: 9 INJECTION, SOLUTION INTRAVENOUS at 11:53

## 2024-07-22 NOTE — ED TRIAGE NOTES
Hx of multiple melanoma, currently undergoing chemotherapy. Physician sent her here for dehydration which showed on some blood work she had gotten drawn. Patient is symptomatic with having dizzy spells and feeling weak.

## 2024-07-22 NOTE — ED PROVIDER NOTES
voice recognition software. Quite often unanticipated grammatical, syntax, homophones, and other interpretive errors are inadvertently transcribed by the computer software. Please disregards these errors. Please excuse any errors that have escaped final proofreading.)       Eligio Hernandez MD  07/23/24 2349

## 2024-07-22 NOTE — DISCHARGE INSTRUCTIONS
Please see your PCP and have repeat blood work done in 1 week and continue to drink fluids by mouth. If you have any fevers, chills, or night sweats please call your doctor or return to he ER immediately.

## 2024-07-28 LAB
BACTERIA SPEC CULT: NORMAL
BACTERIA SPEC CULT: NORMAL
Lab: NORMAL
Lab: NORMAL

## 2024-10-02 ENCOUNTER — APPOINTMENT (OUTPATIENT)
Facility: HOSPITAL | Age: 79
End: 2024-10-02
Payer: MEDICARE

## 2024-10-02 ENCOUNTER — HOSPITAL ENCOUNTER (EMERGENCY)
Facility: HOSPITAL | Age: 79
Discharge: HOME | End: 2024-10-02
Payer: MEDICARE

## 2024-10-02 VITALS
DIASTOLIC BLOOD PRESSURE: 75 MMHG | HEART RATE: 73 BPM | HEIGHT: 60 IN | OXYGEN SATURATION: 100 % | BODY MASS INDEX: 25.52 KG/M2 | WEIGHT: 130 LBS | RESPIRATION RATE: 17 BRPM | TEMPERATURE: 97.8 F | SYSTOLIC BLOOD PRESSURE: 134 MMHG
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
## 2024-10-02 DIAGNOSIS — S80.11XA HEMATOMA OF RIGHT LOWER LEG: ICD-10-CM

## 2024-10-02 PROCEDURE — 73590 X-RAY EXAM OF LOWER LEG: CPT

## 2024-10-02 PROCEDURE — 6370000000 HC RX 637 (ALT 250 FOR IP): Performed by: NURSE PRACTITIONER

## 2024-10-02 PROCEDURE — 99283 EMERGENCY DEPT VISIT LOW MDM: CPT

## 2024-10-02 RX ORDER — HYDROCODONE BITARTRATE AND ACETAMINOPHEN 5; 325 MG/1; MG/1
1 TABLET ORAL EVERY 6 HOURS PRN
Qty: 12 TABLET | Refills: 0 | Status: SHIPPED | OUTPATIENT
Start: 2024-10-02 | End: 2024-10-05

## 2024-10-02 RX ORDER — HYDROCODONE BITARTRATE AND ACETAMINOPHEN 5; 325 MG/1; MG/1
1 TABLET ORAL
Status: COMPLETED | OUTPATIENT
Start: 2024-10-02 | End: 2024-10-02

## 2024-10-02 RX ADMIN — HYDROCODONE BITARTRATE AND ACETAMINOPHEN 1 TABLET: 5; 325 TABLET ORAL at 14:04

## 2024-10-02 ASSESSMENT — PAIN SCALES - GENERAL: PAINLEVEL_OUTOF10: 8

## 2024-10-02 ASSESSMENT — LIFESTYLE VARIABLES
HOW MANY STANDARD DRINKS CONTAINING ALCOHOL DO YOU HAVE ON A TYPICAL DAY: PATIENT DOES NOT DRINK
HOW OFTEN DO YOU HAVE A DRINK CONTAINING ALCOHOL: NEVER

## 2024-10-02 ASSESSMENT — PAIN DESCRIPTION - DESCRIPTORS: DESCRIPTORS: SORE

## 2024-10-02 ASSESSMENT — PAIN DESCRIPTION - PAIN TYPE: TYPE: ACUTE PAIN

## 2024-10-02 ASSESSMENT — PAIN DESCRIPTION - FREQUENCY: FREQUENCY: CONTINUOUS

## 2024-10-02 ASSESSMENT — PAIN DESCRIPTION - ORIENTATION: ORIENTATION: RIGHT;LEFT;LOWER

## 2024-10-02 ASSESSMENT — PAIN DESCRIPTION - ONSET: ONSET: SUDDEN

## 2024-10-02 ASSESSMENT — PAIN - FUNCTIONAL ASSESSMENT: PAIN_FUNCTIONAL_ASSESSMENT: PREVENTS OR INTERFERES SOME ACTIVE ACTIVITIES AND ADLS

## 2024-10-02 ASSESSMENT — PAIN DESCRIPTION - LOCATION: LOCATION: LEG

## 2024-10-02 NOTE — ED TRIAGE NOTES
Pt reports BLE pain/bruising/edema as well as RLE abrasion s/p fall from her recliner on 9/30/24.

## 2024-10-08 ENCOUNTER — HOSPITAL ENCOUNTER (EMERGENCY)
Facility: HOSPITAL | Age: 79
Discharge: HOME | End: 2024-10-08
Attending: EMERGENCY MEDICINE
Payer: MEDICARE

## 2024-10-08 VITALS
WEIGHT: 131.4 LBS | RESPIRATION RATE: 16 BRPM | SYSTOLIC BLOOD PRESSURE: 116 MMHG | HEIGHT: 60 IN | HEART RATE: 63 BPM | BODY MASS INDEX: 25.8 KG/M2 | OXYGEN SATURATION: 98 % | DIASTOLIC BLOOD PRESSURE: 85 MMHG | TEMPERATURE: 97.5 F

## 2024-10-08 DIAGNOSIS — T14.8XXA SKIN AVULSION: Primary | ICD-10-CM

## 2024-10-08 PROCEDURE — 6370000000 HC RX 637 (ALT 250 FOR IP)

## 2024-10-08 PROCEDURE — 99283 EMERGENCY DEPT VISIT LOW MDM: CPT

## 2024-10-08 RX ORDER — BACITRACIN ZINC 500 [USP'U]/G
OINTMENT TOPICAL
Status: COMPLETED
Start: 2024-10-08 | End: 2024-10-08

## 2024-10-08 RX ORDER — BACITRACIN ZINC 500 [USP'U]/G
OINTMENT TOPICAL 2 TIMES DAILY
Status: DISCONTINUED | OUTPATIENT
Start: 2024-10-08 | End: 2024-10-08 | Stop reason: HOSPADM

## 2024-10-08 RX ORDER — BACITRACIN ZINC AND POLYMYXIN B SULFATE 500; 1000 [USP'U]/G; [USP'U]/G
OINTMENT TOPICAL
Qty: 1 EACH | Refills: 1 | Status: SHIPPED | OUTPATIENT
Start: 2024-10-08 | End: 2024-10-15

## 2024-10-08 RX ADMIN — BACITRACIN ZINC: 500 OINTMENT TOPICAL at 18:25

## 2024-10-08 ASSESSMENT — PAIN DESCRIPTION - ORIENTATION
ORIENTATION: RIGHT
ORIENTATION: RIGHT

## 2024-10-08 ASSESSMENT — PAIN - FUNCTIONAL ASSESSMENT
PAIN_FUNCTIONAL_ASSESSMENT: ACTIVITIES ARE NOT PREVENTED
PAIN_FUNCTIONAL_ASSESSMENT: 0-10

## 2024-10-08 ASSESSMENT — PAIN DESCRIPTION - LOCATION
LOCATION: LEG
LOCATION: LEG

## 2024-10-08 ASSESSMENT — PAIN SCALES - GENERAL
PAINLEVEL_OUTOF10: 0
PAINLEVEL_OUTOF10: 8

## 2024-10-08 NOTE — ED PROVIDER NOTES
[facility] EMERGENCY DEPT  EMERGENCY DEPARTMENT ENCOUNTER      Pt Name: Brenda Olivas  MRN: [id number]  Birthdate 1945  Date of evaluation: 10/8/2024  Provider: Rashad Rodriguez MD    CHIEF COMPLAINT       Chief Complaint   Patient presents with    Wound Check         HISTORY OF PRESENT ILLNESS   (Location/Symptom, Timing/Onset, Context/Setting, Quality, Duration, Modifying Factors, Severity)  Note limiting factors.   Brenda Olivas is a 79 y.o. female who presents to the emergency department patient suffered fall from her chair 6 days ago with right lower leg injury evaluated at [facility] emergency department x-ray of the area lower legs showed no fracture skin tear with partial thickness avulsion.  Here today for recheck    HPI    Nursing Notes were reviewed.    REVIEW OF SYSTEMS    (2-9 systems for level 4, 10 or more for level 5)     Review of Systems   All other systems reviewed and are negative.      Except as noted above the remainder of the review of systems was reviewed and negative.       PAST MEDICAL HISTORY     Past Medical History:   Diagnosis Date    Anxiety     Arthritis     Asthma     CAD (coronary artery disease)     Cancer (HCC)     Depression     Hypertension     Multiple myeloma (HCC)          SURGICAL HISTORY       Past Surgical History:   Procedure Laterality Date     SECTION N/A     GASTRIC BYPASS SURGERY N/A     HYSTEROTOMY N/A     uterus removed    JOINT REPLACEMENT Right 2019    hip    ROTATOR CUFF REPAIR Left     TOTAL KNEE ARTHROPLASTY Right 2010    UPPER GASTROINTESTINAL ENDOSCOPY N/A 2024    EGD BIOPSY performed by Tulio Gamboa MD at [facility] ENDOSCOPY         CURRENT MEDICATIONS       Previous Medications    ACYCLOVIR (ZOVIRAX) 200 MG CAPSULE    Take 1 capsule by mouth daily    ALBUTEROL SULFATE HFA (PROVENTIL;VENTOLIN;PROAIR) 108 (90 BASE) MCG/ACT INHALER    Inhale 1 puff into the lungs every 6 hours as needed for Shortness of Breath or Wheezing    ASPIRIN 81 MG EC TABLET

## 2024-10-08 NOTE — ED NOTES
Bacitracin applied to right shin abrasion / skin tear.  Dressed with telfa, abd padding and ace wrap.  Tolerated well.  No issues.

## 2024-10-08 NOTE — ED PROVIDER NOTES
[facility] EMERGENCY DEPT  EMERGENCY DEPARTMENT HISTORY AND PHYSICAL EXAM      Date: 10/2/2024  Patient Name: Brenda Olivas  MRN: [id number]  YOB: 1945  Date of evaluation: 10/2/2024  Provider: CATHIE Vargas NP   Note Started: 12:49 AM EDT 10/8/24    HISTORY OF PRESENT ILLNESS     Chief Complaint   Patient presents with    Leg Pain    Fall       History Provided By: Patient    HPI: Brenda Olivas is a 79 y.o. female with past medical history as listed below presents to the ER with bilateral leg pain.  Patient states she had a slip and fall from her chair last week.  Patient comes in for evaluation.    PAST MEDICAL HISTORY   Past Medical History:  Past Medical History:   Diagnosis Date    Anxiety     Arthritis     Asthma     CAD (coronary artery disease)     Cancer (HCC)     Depression     Hypertension     Multiple myeloma (HCC)        Past Surgical History:  Past Surgical History:   Procedure Laterality Date     SECTION N/A     GASTRIC BYPASS SURGERY N/A     HYSTEROTOMY N/A     uterus removed    JOINT REPLACEMENT Right 2019    hip    ROTATOR CUFF REPAIR Left     TOTAL KNEE ARTHROPLASTY Right     UPPER GASTROINTESTINAL ENDOSCOPY N/A 2024    EGD BIOPSY performed by Tulio Gamboa MD at [facility] ENDOSCOPY       Family History:  History reviewed. No pertinent family history.    Social History:  Social History     Tobacco Use    Smoking status: Former     Current packs/day: 0.50     Types: Cigarettes    Smokeless tobacco: Never   Vaping Use    Vaping status: Never Used   Substance Use Topics    Alcohol use: Never    Drug use: Yes     Types: Marijuana (Weed)       Allergies:  No Known Allergies    PCP: Aida Frazier MD    Current Meds:   No current facility-administered medications for this encounter.     Current Outpatient Medications   Medication Sig Dispense Refill    acyclovir (ZOVIRAX) 200 MG capsule Take 1 capsule by mouth daily      albuterol sulfate HFA (PROVENTIL;VENTOLIN;PROAIR)

## 2024-10-08 NOTE — ED TRIAGE NOTES
Reports wound to right lower leg that recently acquired open area after slipping out of chair about 5 days ago, has swelling noted to BLE

## 2024-10-16 ENCOUNTER — APPOINTMENT (OUTPATIENT)
Facility: HOSPITAL | Age: 79
DRG: 291 | End: 2024-10-16
Payer: MEDICARE

## 2024-10-16 ENCOUNTER — HOSPITAL ENCOUNTER (INPATIENT)
Facility: HOSPITAL | Age: 79
LOS: 7 days | Discharge: NURSING, REHAB OR LONG-TERM CARE | DRG: 291 | End: 2024-10-23
Attending: STUDENT IN AN ORGANIZED HEALTH CARE EDUCATION/TRAINING PROGRAM
Payer: MEDICARE

## 2024-10-16 DIAGNOSIS — I50.43 CHF (CONGESTIVE HEART FAILURE), NYHA CLASS I, ACUTE ON CHRONIC, COMBINED (HCC): ICD-10-CM

## 2024-10-16 DIAGNOSIS — S81.801A WOUND OF RIGHT LOWER EXTREMITY, INITIAL ENCOUNTER: ICD-10-CM

## 2024-10-16 DIAGNOSIS — R06.02 SHORTNESS OF BREATH: ICD-10-CM

## 2024-10-16 DIAGNOSIS — I50.9 ACUTE ON CHRONIC CONGESTIVE HEART FAILURE, UNSPECIFIED HEART FAILURE TYPE (HCC): Primary | ICD-10-CM

## 2024-10-16 LAB
ALBUMIN SERPL-MCNC: 2.1 G/DL (ref 3.5–5)
ALBUMIN/GLOB SERPL: 0.6 (ref 1.1–2.2)
ALP SERPL-CCNC: 146 U/L (ref 45–117)
ALT SERPL-CCNC: 21 U/L (ref 12–78)
ANION GAP SERPL CALC-SCNC: 11 MMOL/L (ref 2–12)
AST SERPL W P-5'-P-CCNC: 29 U/L (ref 15–37)
BASOPHILS # BLD: 0 K/UL (ref 0–0.1)
BASOPHILS NFR BLD: 0 % (ref 0–1)
BILIRUB SERPL-MCNC: 1 MG/DL (ref 0.2–1)
BNP SERPL-MCNC: ABNORMAL PG/ML
BUN SERPL-MCNC: 42 MG/DL (ref 6–20)
BUN/CREAT SERPL: 17 (ref 12–20)
CA-I BLD-MCNC: 6.7 MG/DL (ref 8.5–10.1)
CHLORIDE SERPL-SCNC: 116 MMOL/L (ref 97–108)
CO2 SERPL-SCNC: 16 MMOL/L (ref 21–32)
CREAT SERPL-MCNC: 2.51 MG/DL (ref 0.55–1.02)
DIFFERENTIAL METHOD BLD: ABNORMAL
EKG ATRIAL RATE: 79 BPM
EKG DIAGNOSIS: NORMAL
EKG Q-T INTERVAL: 354 MS
EKG QRS DURATION: 78 MS
EKG QTC CALCULATION (BAZETT): 430 MS
EKG R AXIS: -22 DEGREES
EKG T AXIS: 10 DEGREES
EKG VENTRICULAR RATE: 89 BPM
EOSINOPHIL # BLD: 0.1 K/UL (ref 0–0.4)
EOSINOPHIL NFR BLD: 2 % (ref 0–7)
ERYTHROCYTE [DISTWIDTH] IN BLOOD BY AUTOMATED COUNT: 19.8 % (ref 11.5–14.5)
GLOBULIN SER CALC-MCNC: 3.3 G/DL (ref 2–4)
GLUCOSE SERPL-MCNC: 94 MG/DL (ref 65–100)
HCT VFR BLD AUTO: 20.2 % (ref 35–47)
HGB BLD-MCNC: 6.7 G/DL (ref 11.5–16)
IMM GRANULOCYTES # BLD AUTO: 0 K/UL (ref 0–0.04)
IMM GRANULOCYTES NFR BLD AUTO: 1 % (ref 0–0.5)
LYMPHOCYTES # BLD: 1 K/UL (ref 0.8–3.5)
LYMPHOCYTES NFR BLD: 40 % (ref 12–49)
MAGNESIUM SERPL-MCNC: 1.7 MG/DL (ref 1.6–2.4)
MCH RBC QN AUTO: 36 PG (ref 26–34)
MCHC RBC AUTO-ENTMCNC: 33.2 G/DL (ref 30–36.5)
MCV RBC AUTO: 108.6 FL (ref 80–99)
MONOCYTES # BLD: 0.3 K/UL (ref 0–1)
MONOCYTES NFR BLD: 10 % (ref 5–13)
NEUTS SEG # BLD: 1.2 K/UL (ref 1.8–8)
NEUTS SEG NFR BLD: 47 % (ref 32–75)
NRBC # BLD: 0 K/UL (ref 0–0.01)
NRBC BLD-RTO: 0 PER 100 WBC
PLATELET # BLD AUTO: 167 K/UL (ref 150–400)
PMV BLD AUTO: 11.9 FL (ref 8.9–12.9)
POTASSIUM SERPL-SCNC: 4.3 MMOL/L (ref 3.5–5.1)
PROT SERPL-MCNC: 5.4 G/DL (ref 6.4–8.2)
RBC # BLD AUTO: 1.86 M/UL (ref 3.8–5.2)
RBC MORPH BLD: ABNORMAL
RBC MORPH BLD: ABNORMAL
SODIUM SERPL-SCNC: 143 MMOL/L (ref 136–145)
TROPONIN I SERPL HS-MCNC: 16 NG/L (ref 0–51)
TROPONIN I SERPL HS-MCNC: 17 NG/L (ref 0–51)
WBC # BLD AUTO: 2.6 K/UL (ref 3.6–11)

## 2024-10-16 PROCEDURE — 76882 US LMTD JT/FCL EVL NVASC XTR: CPT

## 2024-10-16 PROCEDURE — 86870 RBC ANTIBODY IDENTIFICATION: CPT

## 2024-10-16 PROCEDURE — 80053 COMPREHEN METABOLIC PANEL: CPT

## 2024-10-16 PROCEDURE — 2580000003 HC RX 258

## 2024-10-16 PROCEDURE — 2060000000 HC ICU INTERMEDIATE R&B

## 2024-10-16 PROCEDURE — 73590 X-RAY EXAM OF LOWER LEG: CPT

## 2024-10-16 PROCEDURE — 83880 ASSAY OF NATRIURETIC PEPTIDE: CPT

## 2024-10-16 PROCEDURE — 83735 ASSAY OF MAGNESIUM: CPT

## 2024-10-16 PROCEDURE — 99285 EMERGENCY DEPT VISIT HI MDM: CPT

## 2024-10-16 PROCEDURE — 84484 ASSAY OF TROPONIN QUANT: CPT

## 2024-10-16 PROCEDURE — 71045 X-RAY EXAM CHEST 1 VIEW: CPT

## 2024-10-16 PROCEDURE — 6360000002 HC RX W HCPCS: Performed by: STUDENT IN AN ORGANIZED HEALTH CARE EDUCATION/TRAINING PROGRAM

## 2024-10-16 PROCEDURE — 86920 COMPATIBILITY TEST SPIN: CPT

## 2024-10-16 PROCEDURE — 85025 COMPLETE CBC W/AUTO DIFF WBC: CPT

## 2024-10-16 PROCEDURE — 10030 IMG GID FLU COLL DRG SFT TIS: CPT

## 2024-10-16 PROCEDURE — 86900 BLOOD TYPING SEROLOGIC ABO: CPT

## 2024-10-16 PROCEDURE — 36415 COLL VENOUS BLD VENIPUNCTURE: CPT

## 2024-10-16 PROCEDURE — G0378 HOSPITAL OBSERVATION PER HR: HCPCS

## 2024-10-16 PROCEDURE — 86850 RBC ANTIBODY SCREEN: CPT

## 2024-10-16 PROCEDURE — 86901 BLOOD TYPING SEROLOGIC RH(D): CPT

## 2024-10-16 PROCEDURE — 93005 ELECTROCARDIOGRAM TRACING: CPT | Performed by: STUDENT IN AN ORGANIZED HEALTH CARE EDUCATION/TRAINING PROGRAM

## 2024-10-16 RX ORDER — ALBUTEROL SULFATE 90 UG/1
2 INHALANT RESPIRATORY (INHALATION)
Status: DISCONTINUED | OUTPATIENT
Start: 2024-10-16 | End: 2024-10-20

## 2024-10-16 RX ORDER — MIRTAZAPINE 15 MG/1
15 TABLET, FILM COATED ORAL NIGHTLY
Status: DISCONTINUED | OUTPATIENT
Start: 2024-10-16 | End: 2024-10-23 | Stop reason: HOSPADM

## 2024-10-16 RX ORDER — SODIUM CHLORIDE 9 MG/ML
INJECTION, SOLUTION INTRAVENOUS PRN
Status: DISCONTINUED | OUTPATIENT
Start: 2024-10-16 | End: 2024-10-23 | Stop reason: HOSPADM

## 2024-10-16 RX ORDER — ONDANSETRON 2 MG/ML
4 INJECTION INTRAMUSCULAR; INTRAVENOUS EVERY 6 HOURS PRN
Status: DISCONTINUED | OUTPATIENT
Start: 2024-10-16 | End: 2024-10-23 | Stop reason: HOSPADM

## 2024-10-16 RX ORDER — PANTOPRAZOLE SODIUM 40 MG/10ML
40 INJECTION, POWDER, LYOPHILIZED, FOR SOLUTION INTRAVENOUS DAILY
Status: DISCONTINUED | OUTPATIENT
Start: 2024-10-17 | End: 2024-10-19

## 2024-10-16 RX ORDER — SODIUM CHLORIDE 0.9 % (FLUSH) 0.9 %
5-40 SYRINGE (ML) INJECTION PRN
Status: DISCONTINUED | OUTPATIENT
Start: 2024-10-16 | End: 2024-10-23 | Stop reason: HOSPADM

## 2024-10-16 RX ORDER — FUROSEMIDE 10 MG/ML
40 INJECTION INTRAMUSCULAR; INTRAVENOUS 2 TIMES DAILY
Status: DISCONTINUED | OUTPATIENT
Start: 2024-10-17 | End: 2024-10-19

## 2024-10-16 RX ORDER — ACETAMINOPHEN 650 MG/1
650 SUPPOSITORY RECTAL EVERY 6 HOURS PRN
Status: DISCONTINUED | OUTPATIENT
Start: 2024-10-16 | End: 2024-10-23 | Stop reason: HOSPADM

## 2024-10-16 RX ORDER — BUDESONIDE AND FORMOTEROL FUMARATE DIHYDRATE 160; 4.5 UG/1; UG/1
2 AEROSOL RESPIRATORY (INHALATION)
Status: DISCONTINUED | OUTPATIENT
Start: 2024-10-17 | End: 2024-10-23 | Stop reason: HOSPADM

## 2024-10-16 RX ORDER — SODIUM CHLORIDE 0.9 % (FLUSH) 0.9 %
5-40 SYRINGE (ML) INJECTION EVERY 12 HOURS SCHEDULED
Status: DISCONTINUED | OUTPATIENT
Start: 2024-10-16 | End: 2024-10-23 | Stop reason: HOSPADM

## 2024-10-16 RX ORDER — ONDANSETRON 4 MG/1
4 TABLET, ORALLY DISINTEGRATING ORAL EVERY 8 HOURS PRN
Status: DISCONTINUED | OUTPATIENT
Start: 2024-10-16 | End: 2024-10-23 | Stop reason: HOSPADM

## 2024-10-16 RX ORDER — ACETAMINOPHEN 325 MG/1
650 TABLET ORAL EVERY 6 HOURS PRN
Status: DISCONTINUED | OUTPATIENT
Start: 2024-10-16 | End: 2024-10-23 | Stop reason: HOSPADM

## 2024-10-16 RX ORDER — POLYETHYLENE GLYCOL 3350 17 G/17G
17 POWDER, FOR SOLUTION ORAL DAILY PRN
Status: DISCONTINUED | OUTPATIENT
Start: 2024-10-16 | End: 2024-10-23 | Stop reason: HOSPADM

## 2024-10-16 RX ORDER — FUROSEMIDE 10 MG/ML
40 INJECTION INTRAMUSCULAR; INTRAVENOUS ONCE
Status: COMPLETED | OUTPATIENT
Start: 2024-10-16 | End: 2024-10-16

## 2024-10-16 RX ADMIN — FUROSEMIDE 40 MG: 10 INJECTION, SOLUTION INTRAMUSCULAR; INTRAVENOUS at 23:39

## 2024-10-16 RX ADMIN — SODIUM CHLORIDE, PRESERVATIVE FREE 10 ML: 5 INJECTION INTRAVENOUS at 23:44

## 2024-10-16 ASSESSMENT — LIFESTYLE VARIABLES
HOW OFTEN DO YOU HAVE A DRINK CONTAINING ALCOHOL: NEVER
HOW MANY STANDARD DRINKS CONTAINING ALCOHOL DO YOU HAVE ON A TYPICAL DAY: PATIENT DOES NOT DRINK

## 2024-10-16 ASSESSMENT — PAIN SCALES - GENERAL: PAINLEVEL_OUTOF10: 8

## 2024-10-16 ASSESSMENT — PAIN - FUNCTIONAL ASSESSMENT: PAIN_FUNCTIONAL_ASSESSMENT: 0-10

## 2024-10-16 NOTE — ED TRIAGE NOTES
C/o shortness of breath, feeling weak, felt like she was going to pass out this AM. Reports sore on right leg, causing pain.   Hx multiple myeloma on chemotherapy

## 2024-10-16 NOTE — CARE COORDINATION
10/16/24 6924   Service Assessment   Patient Orientation Alert and Oriented   Cognition Alert   History Provided By Patient   Primary Caregiver Self   Accompanied By/Relationship    Support Systems Spouse/Significant Other   Patient's Healthcare Decision Maker is: Legal Next of Kin   PCP Verified by CM Yes  (Aida Frazier - seen in September.)   Last Visit to PCP Within last 3 months   Prior Functional Level Independent in ADLs/IADLs;Mobility  (Walker)   Current Functional Level Assistance with the following:;Bathing;Dressing;Toileting;Cooking;Housework;Shopping;Mobility  (Walker)   Can patient return to prior living arrangement Unknown at present  (Pt requesting SNF)   Ability to make needs known: Good   Family able to assist with home care needs: No   Would you like for me to discuss the discharge plan with any other family members/significant others, and if so, who? Yes  ( Virgil Olivas)   Financial Resources Medicare   Community Resources None   CM/SW Referral Other (see comment)  (None)   Social/Functional History   Lives With Spouse   Type of Home House   Home Layout One level   Home Access Ramped entrance   Bathroom Shower/Tub Tub/Shower unit   Bathroom Toilet Standard   Bathroom Equipment Toilet raiser   Bathroom Accessibility Accessible   Home Equipment Walker - Standard   Receives Help From Family   ADL Assistance Needs assistance   Toileting Needs assistance   Homemaking Assistance Needs assistance   Homemaking Responsibilities Yes   Ambulation Assistance Needs assistance  (Walker)   Transfer Assistance Needs assistance   Active  No   Occupation Retired   Discharge Planning   Type of Residence Skilled Nursing Facility  (Pt signed Choice Letter for SNF/Referral.)   Living Arrangements Spouse/Significant Other   Current Services Prior To Admission None   Potential Assistance Needed Skilled Nursing Facility;Transportation   DME Ordered? Bedside commode  (Pt signed Choice Letter for - if

## 2024-10-16 NOTE — ED PROVIDER NOTES
EMERGENCY DEPARTMENT HISTORY AND PHYSICAL EXAM      Date: 10/16/2024  Patient Name: Brenda Olivas  MRN: [id number]  YOB: 1945  Date of evaluation: 10/16/2024  Provider: Misha Mazariegos MD     History of Present Illness     Chief Complaint   Patient presents with    Shortness of Breath           Fatigue    Near Syncope       History Provided By: Patient and     HPI: Brenda Olivas, 79 y.o. female with past medical history as listed and reviewed below presenting to the ED for evaluation of syncope shortness of breath.  Patient notes progressive shortness of breath over the last 1 to 2 weeks, she notes she has not been taking her diuretic medications for that time has had accumulating swelling in the legs as well as shortness of breath with ambulation.  Today she notes she was ambulating to the car when she felt short of breath without chest pain and then syncopized, was caught by her , no trauma or fall.  She is not on any blood thinners.  She does have history of cancer, multiple myeloma, she is on oral chemotherapy daily.  She denies any other symptoms at this time, no cough, no fever or chills    Medical History     Past Medical History:  Past Medical History:   Diagnosis Date    Anxiety     Arthritis     Asthma     CAD (coronary artery disease)     Cancer (HCC)     Depression     Hypertension     Multiple myeloma (HCC)        Past Surgical History:  Past Surgical History:   Procedure Laterality Date     SECTION N/A     GASTRIC BYPASS SURGERY N/A     HYSTEROTOMY N/A     uterus removed    JOINT REPLACEMENT Right     hip    ROTATOR CUFF REPAIR Left     TOTAL KNEE ARTHROPLASTY Right     UPPER GASTROINTESTINAL ENDOSCOPY N/A 2024    EGD BIOPSY performed by Tulio Gamboa MD at [facility] ENDOSCOPY       Family History:  No family history on file.    Social History:  Social History     Tobacco Use    Smoking status: Former     Current packs/day: 0.50     Types: Cigarettes

## 2024-10-17 ENCOUNTER — HOSPITAL ENCOUNTER (INPATIENT)
Facility: HOSPITAL | Age: 79
Discharge: HOME | DRG: 291 | End: 2024-10-19
Payer: MEDICARE

## 2024-10-17 ENCOUNTER — APPOINTMENT (OUTPATIENT)
Facility: HOSPITAL | Age: 79
DRG: 291 | End: 2024-10-17
Payer: MEDICARE

## 2024-10-17 ENCOUNTER — HOSPITAL ENCOUNTER (INPATIENT)
Facility: HOSPITAL | Age: 79
DRG: 291 | End: 2024-10-17
Payer: MEDICARE

## 2024-10-17 PROBLEM — I50.43 CHF (CONGESTIVE HEART FAILURE), NYHA CLASS I, ACUTE ON CHRONIC, COMBINED (HCC): Chronic | Status: ACTIVE | Noted: 2024-10-16

## 2024-10-17 PROBLEM — D64.9 ANEMIA: Chronic | Status: ACTIVE | Noted: 2024-10-17

## 2024-10-17 PROBLEM — F32.A DEPRESSION: Chronic | Status: ACTIVE | Noted: 2024-10-17

## 2024-10-17 PROBLEM — N17.9 AKI (ACUTE KIDNEY INJURY) (HCC): Status: ACTIVE | Noted: 2024-10-17

## 2024-10-17 PROBLEM — N18.9 CKD (CHRONIC KIDNEY DISEASE): Status: ACTIVE | Noted: 2024-10-17

## 2024-10-17 PROBLEM — N18.9 CKD (CHRONIC KIDNEY DISEASE): Chronic | Status: ACTIVE | Noted: 2024-10-17

## 2024-10-17 PROBLEM — I10 HTN (HYPERTENSION): Chronic | Status: ACTIVE | Noted: 2024-10-17

## 2024-10-17 LAB
ALBUMIN SERPL-MCNC: 2.2 G/DL (ref 3.5–5)
ALBUMIN/GLOB SERPL: 0.6 (ref 1.1–2.2)
ALP SERPL-CCNC: 147 U/L (ref 45–117)
ALT SERPL-CCNC: 21 U/L (ref 12–78)
ANION GAP SERPL CALC-SCNC: 10 MMOL/L (ref 2–12)
AST SERPL W P-5'-P-CCNC: 14 U/L (ref 15–37)
BASOPHILS # BLD: 0 K/UL (ref 0–0.1)
BASOPHILS NFR BLD: 0 % (ref 0–1)
BILIRUB SERPL-MCNC: 1.5 MG/DL (ref 0.2–1)
BUN SERPL-MCNC: 41 MG/DL (ref 6–20)
BUN/CREAT SERPL: 16 (ref 12–20)
CA-I BLD-MCNC: 6.6 MG/DL (ref 8.5–10.1)
CHLORIDE SERPL-SCNC: 116 MMOL/L (ref 97–108)
CO2 SERPL-SCNC: 17 MMOL/L (ref 21–32)
CREAT SERPL-MCNC: 2.53 MG/DL (ref 0.55–1.02)
DIFFERENTIAL METHOD BLD: ABNORMAL
ECHO AO ASC DIAM: 2.7 CM
ECHO AO ASCENDING AORTA INDEX: 1.74 CM/M2
ECHO AO ROOT DIAM: 3 CM
ECHO AO ROOT INDEX: 1.94 CM/M2
ECHO AV AREA PEAK VELOCITY: 2.2 CM2
ECHO AV AREA VTI: 2.5 CM2
ECHO AV AREA/BSA PEAK VELOCITY: 1.4 CM2/M2
ECHO AV AREA/BSA VTI: 1.6 CM2/M2
ECHO AV MEAN GRADIENT: 2 MMHG
ECHO AV MEAN VELOCITY: 0.7 M/S
ECHO AV PEAK GRADIENT: 3 MMHG
ECHO AV PEAK VELOCITY: 0.9 M/S
ECHO AV VELOCITY RATIO: 0.89
ECHO AV VTI: 14.2 CM
ECHO BSA: 1.58 M2
ECHO BSA: 1.58 M2
ECHO EST RA PRESSURE: 3 MMHG
ECHO IVC EXP: 1.1 CM
ECHO LA AREA 4C: 11.9 CM2
ECHO LA DIAMETER INDEX: 1.68 CM/M2
ECHO LA DIAMETER: 2.6 CM
ECHO LA MAJOR AXIS: 4.2 CM
ECHO LA TO AORTIC ROOT RATIO: 0.87
ECHO LA VOL MOD A4C: 26 ML (ref 22–52)
ECHO LA VOLUME INDEX MOD A4C: 17 ML/M2 (ref 16–34)
ECHO LV E' LATERAL VELOCITY: 10.4 CM/S
ECHO LV E' SEPTAL VELOCITY: 7 CM/S
ECHO LV EDV 3D: 58 ML
ECHO LV EDV A4C: 52 ML
ECHO LV EDV INDEX 3D: 37 ML/M2
ECHO LV EDV INDEX A4C: 34 ML/M2
ECHO LV EJECTION FRACTION 3D: 57 %
ECHO LV EJECTION FRACTION A4C: 62 %
ECHO LV EJECTION FRACTION BIPLANE: 62 % (ref 55–100)
ECHO LV ESV 3D: 25 ML
ECHO LV ESV A4C: 20 ML
ECHO LV ESV INDEX 3D: 16 ML/M2
ECHO LV ESV INDEX A4C: 13 ML/M2
ECHO LV FRACTIONAL SHORTENING: 27 % (ref 28–44)
ECHO LV INTERNAL DIMENSION DIASTOLE INDEX: 2.13 CM/M2
ECHO LV INTERNAL DIMENSION DIASTOLIC: 3.3 CM (ref 3.9–5.3)
ECHO LV INTERNAL DIMENSION SYSTOLIC INDEX: 1.55 CM/M2
ECHO LV INTERNAL DIMENSION SYSTOLIC: 2.4 CM
ECHO LV IVSD: 1.2 CM (ref 0.6–0.9)
ECHO LV MASS 2D: 124.8 G (ref 67–162)
ECHO LV MASS 3D INDEX: 61.9 G/M2
ECHO LV MASS 3D: 96 G
ECHO LV MASS INDEX 2D: 80.5 G/M2 (ref 43–95)
ECHO LV POSTERIOR WALL DIASTOLIC: 1.2 CM (ref 0.6–0.9)
ECHO LV RELATIVE WALL THICKNESS RATIO: 0.73
ECHO LVOT AREA: 2.3 CM2
ECHO LVOT AV VTI INDEX: 1.11
ECHO LVOT DIAM: 1.7 CM
ECHO LVOT MEAN GRADIENT: 1 MMHG
ECHO LVOT PEAK GRADIENT: 3 MMHG
ECHO LVOT PEAK VELOCITY: 0.8 M/S
ECHO LVOT STROKE VOLUME INDEX: 23.1 ML/M2
ECHO LVOT SV: 35.8 ML
ECHO LVOT VTI: 15.8 CM
ECHO MV A VELOCITY: 0.89 M/S
ECHO MV AREA VTI: 1.5 CM2
ECHO MV E DECELERATION TIME (DT): 320 MS
ECHO MV E VELOCITY: 0.57 M/S
ECHO MV E/A RATIO: 0.64
ECHO MV E/E' LATERAL: 5.48
ECHO MV E/E' RATIO (AVERAGED): 6.81
ECHO MV E/E' SEPTAL: 8.14
ECHO MV LVOT VTI INDEX: 1.51
ECHO MV MAX VELOCITY: 1 M/S
ECHO MV MEAN GRADIENT: 2 MMHG
ECHO MV MEAN VELOCITY: 0.5 M/S
ECHO MV PEAK GRADIENT: 4 MMHG
ECHO MV REGURGITANT PEAK GRADIENT: 25 MMHG
ECHO MV REGURGITANT PEAK VELOCITY: 2.5 M/S
ECHO MV VTI: 23.8 CM
ECHO PULMONARY ARTERY END DIASTOLIC PRESSURE: 4 MMHG
ECHO PV ACCELERATION TIME (AT): 102 MS
ECHO PV MAX VELOCITY: 0.8 M/S
ECHO PV PEAK GRADIENT: 3 MMHG
ECHO PV REGURGITANT MAX VELOCITY: 1 M/S
ECHO RA AREA 4C: 9.7 CM2
ECHO RA END SYSTOLIC VOLUME APICAL 4 CHAMBER INDEX BSA: 10 ML/M2
ECHO RA VOLUME: 16 ML
ECHO RIGHT VENTRICULAR SYSTOLIC PRESSURE (RVSP): 12 MMHG
ECHO RV BASAL DIMENSION: 2.7 CM
ECHO RV FREE WALL PEAK S': 14.9 CM/S
ECHO RV TAPSE: 1.4 CM (ref 1.7–?)
ECHO TV REGURGITANT MAX VELOCITY: 1.52 M/S
ECHO TV REGURGITANT PEAK GRADIENT: 9 MMHG
EOSINOPHIL # BLD: 0 K/UL (ref 0–0.4)
EOSINOPHIL NFR BLD: 2 % (ref 0–7)
ERYTHROCYTE [DISTWIDTH] IN BLOOD BY AUTOMATED COUNT: 23.9 % (ref 11.5–14.5)
GLOBULIN SER CALC-MCNC: 3.4 G/DL (ref 2–4)
GLUCOSE SERPL-MCNC: 86 MG/DL (ref 65–100)
HCT VFR BLD AUTO: 24.6 % (ref 35–47)
HGB BLD-MCNC: 8.4 G/DL (ref 11.5–16)
IMM GRANULOCYTES # BLD AUTO: 0 K/UL
IMM GRANULOCYTES NFR BLD AUTO: 0 %
LYMPHOCYTES # BLD: 1 K/UL (ref 0.8–3.5)
LYMPHOCYTES NFR BLD: 42 % (ref 12–49)
MAGNESIUM SERPL-MCNC: 1.6 MG/DL (ref 1.6–2.4)
MCH RBC QN AUTO: 34 PG (ref 26–34)
MCHC RBC AUTO-ENTMCNC: 34.1 G/DL (ref 30–36.5)
MCV RBC AUTO: 99.6 FL (ref 80–99)
METAMYELOCYTES NFR BLD MANUAL: 2 %
MONOCYTES # BLD: 0.4 K/UL (ref 0–1)
MONOCYTES NFR BLD: 18 % (ref 5–13)
NEUTS BAND NFR BLD MANUAL: 2 % (ref 0–6)
NEUTS SEG # BLD: 0.9 K/UL (ref 1.8–8)
NEUTS SEG NFR BLD: 34 % (ref 32–75)
NRBC # BLD: 0.02 K/UL (ref 0–0.01)
NRBC BLD-RTO: 0.8 PER 100 WBC
PHOSPHATE SERPL-MCNC: 3.1 MG/DL (ref 2.6–4.7)
PLATELET # BLD AUTO: 126 K/UL (ref 150–400)
PMV BLD AUTO: 10.8 FL (ref 8.9–12.9)
POTASSIUM SERPL-SCNC: 3.7 MMOL/L (ref 3.5–5.1)
PROT SERPL-MCNC: 5.6 G/DL (ref 6.4–8.2)
RBC # BLD AUTO: 2.47 M/UL (ref 3.8–5.2)
RBC MORPH BLD: ABNORMAL
RBC MORPH BLD: ABNORMAL
SODIUM SERPL-SCNC: 143 MMOL/L (ref 136–145)
TOTAL CELLS COUNTED SPEC: 50
WBC # BLD AUTO: 2.4 K/UL (ref 3.6–11)

## 2024-10-17 PROCEDURE — 6370000000 HC RX 637 (ALT 250 FOR IP)

## 2024-10-17 PROCEDURE — 85025 COMPLETE CBC W/AUTO DIFF WBC: CPT

## 2024-10-17 PROCEDURE — 82652 VIT D 1 25-DIHYDROXY: CPT

## 2024-10-17 PROCEDURE — 93970 EXTREMITY STUDY: CPT

## 2024-10-17 PROCEDURE — 97530 THERAPEUTIC ACTIVITIES: CPT

## 2024-10-17 PROCEDURE — 84466 ASSAY OF TRANSFERRIN: CPT

## 2024-10-17 PROCEDURE — 93306 TTE W/DOPPLER COMPLETE: CPT

## 2024-10-17 PROCEDURE — 76770 US EXAM ABDO BACK WALL COMP: CPT

## 2024-10-17 PROCEDURE — 94640 AIRWAY INHALATION TREATMENT: CPT

## 2024-10-17 PROCEDURE — 2060000000 HC ICU INTERMEDIATE R&B

## 2024-10-17 PROCEDURE — 6360000002 HC RX W HCPCS: Performed by: INTERNAL MEDICINE

## 2024-10-17 PROCEDURE — 36430 TRANSFUSION BLD/BLD COMPNT: CPT

## 2024-10-17 PROCEDURE — P9016 RBC LEUKOCYTES REDUCED: HCPCS

## 2024-10-17 PROCEDURE — 82306 VITAMIN D 25 HYDROXY: CPT

## 2024-10-17 PROCEDURE — 86922 COMPATIBILITY TEST ANTIGLOB: CPT

## 2024-10-17 PROCEDURE — 94761 N-INVAS EAR/PLS OXIMETRY MLT: CPT

## 2024-10-17 PROCEDURE — 83735 ASSAY OF MAGNESIUM: CPT

## 2024-10-17 PROCEDURE — 6360000002 HC RX W HCPCS

## 2024-10-17 PROCEDURE — 70450 CT HEAD/BRAIN W/O DYE: CPT

## 2024-10-17 PROCEDURE — 80053 COMPREHEN METABOLIC PANEL: CPT

## 2024-10-17 PROCEDURE — 84100 ASSAY OF PHOSPHORUS: CPT

## 2024-10-17 PROCEDURE — 97165 OT EVAL LOW COMPLEX 30 MIN: CPT

## 2024-10-17 PROCEDURE — 86921 COMPATIBILITY TEST INCUBATE: CPT

## 2024-10-17 PROCEDURE — 82746 ASSAY OF FOLIC ACID SERUM: CPT

## 2024-10-17 PROCEDURE — 82607 VITAMIN B-12: CPT

## 2024-10-17 PROCEDURE — 82728 ASSAY OF FERRITIN: CPT

## 2024-10-17 PROCEDURE — 2580000003 HC RX 258

## 2024-10-17 PROCEDURE — 30233N1 TRANSFUSION OF NONAUTOLOGOUS RED BLOOD CELLS INTO PERIPHERAL VEIN, PERCUTANEOUS APPROACH: ICD-10-PCS | Performed by: INTERNAL MEDICINE

## 2024-10-17 PROCEDURE — 71250 CT THORAX DX C-: CPT

## 2024-10-17 PROCEDURE — 97161 PT EVAL LOW COMPLEX 20 MIN: CPT

## 2024-10-17 PROCEDURE — 2700000000 HC OXYGEN THERAPY PER DAY

## 2024-10-17 PROCEDURE — 83970 ASSAY OF PARATHORMONE: CPT

## 2024-10-17 PROCEDURE — 2580000003 HC RX 258: Performed by: INTERNAL MEDICINE

## 2024-10-17 PROCEDURE — 83540 ASSAY OF IRON: CPT

## 2024-10-17 RX ORDER — POLYETHYLENE GLYCOL 3350 17 G/17G
17 POWDER, FOR SOLUTION ORAL 2 TIMES DAILY
Status: DISCONTINUED | OUTPATIENT
Start: 2024-10-17 | End: 2024-10-23 | Stop reason: HOSPADM

## 2024-10-17 RX ORDER — IPRATROPIUM BROMIDE AND ALBUTEROL SULFATE 2.5; .5 MG/3ML; MG/3ML
SOLUTION RESPIRATORY (INHALATION)
Status: DISPENSED
Start: 2024-10-17 | End: 2024-10-18

## 2024-10-17 RX ORDER — SODIUM BICARBONATE 650 MG/1
650 TABLET ORAL 3 TIMES DAILY
Status: DISCONTINUED | OUTPATIENT
Start: 2024-10-17 | End: 2024-10-18

## 2024-10-17 RX ADMIN — Medication 2 PUFF: at 13:36

## 2024-10-17 RX ADMIN — POLYETHYLENE GLYCOL 3350 17 G: 17 POWDER, FOR SOLUTION ORAL at 22:09

## 2024-10-17 RX ADMIN — SODIUM BICARBONATE 650 MG: 650 TABLET ORAL at 16:14

## 2024-10-17 RX ADMIN — MIRTAZAPINE 15 MG: 15 TABLET, FILM COATED ORAL at 22:09

## 2024-10-17 RX ADMIN — Medication 2 PUFF: at 09:49

## 2024-10-17 RX ADMIN — SODIUM CHLORIDE, PRESERVATIVE FREE 10 ML: 5 INJECTION INTRAVENOUS at 22:15

## 2024-10-17 RX ADMIN — EPOETIN ALFA-EPBX 10000 UNITS: 10000 INJECTION, SOLUTION INTRAVENOUS; SUBCUTANEOUS at 18:14

## 2024-10-17 RX ADMIN — Medication 2 PUFF: at 21:02

## 2024-10-17 RX ADMIN — FUROSEMIDE 40 MG: 10 INJECTION, SOLUTION INTRAMUSCULAR; INTRAVENOUS at 18:13

## 2024-10-17 RX ADMIN — CEFAZOLIN 2000 MG: 1 INJECTION, POWDER, FOR SOLUTION INTRAMUSCULAR; INTRAVENOUS at 16:13

## 2024-10-17 RX ADMIN — Medication 2 PUFF: at 10:09

## 2024-10-17 RX ADMIN — SODIUM CHLORIDE, PRESERVATIVE FREE 10 ML: 5 INJECTION INTRAVENOUS at 23:45

## 2024-10-17 RX ADMIN — ACETAMINOPHEN 650 MG: 325 TABLET ORAL at 12:42

## 2024-10-17 RX ADMIN — SODIUM BICARBONATE 650 MG: 650 TABLET ORAL at 22:09

## 2024-10-17 RX ADMIN — Medication 2 PUFF: at 16:57

## 2024-10-17 ASSESSMENT — PAIN SCALES - GENERAL
PAINLEVEL_OUTOF10: 0
PAINLEVEL_OUTOF10: 0
PAINLEVEL_OUTOF10: 6
PAINLEVEL_OUTOF10: 4

## 2024-10-17 ASSESSMENT — PAIN DESCRIPTION - LOCATION
LOCATION: LEG
LOCATION: LEG

## 2024-10-17 ASSESSMENT — PAIN DESCRIPTION - ORIENTATION
ORIENTATION: RIGHT
ORIENTATION: RIGHT

## 2024-10-17 ASSESSMENT — PAIN DESCRIPTION - DESCRIPTORS
DESCRIPTORS: ACHING
DESCRIPTORS: ACHING

## 2024-10-17 NOTE — WOUND CARE
IP WOUND CONSULT    Brenda Olivas  MEDICAL RECORD NUMBER:  [id number]  AGE: 79 y.o.   GENDER: female  : 1945  TODAY'S DATE:  10/17/2024  Places; hospital units: 478    GENERAL     [] Follow-up   [x] New Consult          PAST MEDICAL HISTORY    Past Medical History:   Diagnosis Date    Anxiety     Arthritis     Asthma     CAD (coronary artery disease)     Cancer (HCC)     multiple myeloma, currenly getting chemo po    Depression     Hypertension     Multiple myeloma (HCC)         ALLERGIES    No Known Allergies       Orientation: AxO x4    Neymar 16    Nutritionist Consulted:   Nutrition Status:    Contributing Factors: edema, decreased mobility, immunosuppression, and Fall at home    Assessment     Patient resting in bed, states that she fell at home and hit leg and then wound appeared.     Traumatic wound to right lower leg, area is swollen and discolored with small amount of drainage and slight odor. Wound cleansed with NS and then covered with xeroform and abd pad and secured with rolled gauze and tape. Dressing to be changed daily and PRN for soiling.     Patient may benefit from surgical/vascular evaluation of wound.     Wound 10/16/24 Pretibial Right Red, swollen, with a black area in the middle. About the size of a baseball. (Active)   Wound Image   10/17/24 1058   Wound Etiology Traumatic 10/17/24 1058   Dressing Status New dressing applied 10/17/24 1058   Wound Cleansed Cleansed with saline 10/17/24 1058   Dressing/Treatment Xeroform;ABD;Roll gauze 10/17/24 1058   Wound Assessment Pink/red;Other (Comment) 10/17/24 1058   Drainage Amount Small (< 25%) 10/17/24 1058   Drainage Description Serosanguinous 10/17/24 1058   Odor Mild 10/17/24 1058   Amber-wound Assessment Ecchymosis;Edematous 10/17/24 1058   Margins Attached edges 10/17/24 1058   Number of days: 0        Recommend applying foam dressing to sacrum and heels for protection. Dressing to be changed every 3 days and PRN for soiling, nurse to

## 2024-10-17 NOTE — CARE COORDINATION
Patient recc for SNF, referrals sent, CM met with patient and family present in room to discuss final choice, with some discussion, patient gave choice for [facility] in order for family to visit as they all live in that area.  CM discussed how she would have to bring her chemo medication to any facility, she verbalized understanding and stated she would be able to do that.    Awaiting possible acceptance at [facility], patient will not have to get insurance auth prior to d/c.

## 2024-10-17 NOTE — H&P
Hospitalist Admission Note    NAME: Brenda Olivas   :  1945   MRN:  [id number]     Date/Time:  10/16/2024 9:43 PM    Patient PCP: Aida Frazier MD    ______________________________________________________________________  Given the patient's current clinical presentation, I have a high level of concern for decompensation if discharged from the emergency department.  Complex decision making was performed, which includes reviewing the patient's available past medical records, laboratory results, and x-ray films.       My assessment of this patient's clinical condition and my plan of care is as follows.    Assessment / Plan:    Acute hypoxic respiratory failure secondary to CHF  Acute on chronic heart failure exacerbation, EF 60 to 65%   -Medications ran out, not taking medications due to right lower leg pain  -proBNP elevated to 10,000  -Will continue diuresis with IV Lasix 40 mg twice daily  -Monitor I's and O's  -Monitor creatinine, magnesium and potassium  -Daily weights  -Repeat ECHO   -Doppler to r/o DVTs   -Appreciate cardiology    Acute on chronic anemia/macrocytic anemia  Leukopenia secondary to chemo  Multiple myeloma on chemotherapy (On POMALYST)  -Takes 21 days daily and 7 days off, she is on 15/21 Day   -Is not formally ordered so patient can take on her own from home  -Status post 1 PRBC, Hb 6.8   -Suspect this acute anemia could be due to right lower extremity wound/chemo  -Monitor CBC daily  -Appears to take B12 injections  -Will obtain anemia workup  -Will obtain FOBT    Presyncope/lightheadedness  -Could be cardiogenic/anemia  -Monitor on telemetry for any arrhythmias  -Continue to statics  -CT head WO     ASHANTI on CKD  Metabolic acidosis?  -Obtain VBG  -CKD could be due to hypertension/multiple myeloma  -ASHANTI could be prerenal versus cardiorenal  -Baseline appears to be 2.25, elevated to 2.51  -Will consult nephrology    R lower extremity necrotic wound  -Wound after sleeping  -doubt

## 2024-10-17 NOTE — ED NOTES
Assumed care for this patient. Report received from Janis POPE    
Consent for blood transfusion obtained and secured.     Placed patient on purewick and diaper. Noted blanchable redness on the sacral area.     Also noted wound on the right lower leg measuring approx 8cm x 11 cm. Non adhesive wound dressing applied.       
Informed with Dr Ortega that ED MD ordered Lasix. This writer was planning to give it with the blood, unfortunately blood transfusion cannot be done at this time. Confirmed with Dr Ortega, can give lasix first while waiting for the blood.   
Patient was sent to CT  
Provider informed that GFR was low for CTA, CTA was cancelled. CT informed. Type screen sent, blood releases at this time. Provider updated patient and family on plan of care.     This writer noted bruises on the right forearm, above the IV site. Family stated it  appeared after IV was placed and that patient bruised easily. Noted good back flow on the IV, flushing well without occlusion.   
Pt to CT at this time  
Received call form Blood bank, patient's type screen tested positive for antibodies. Will need to send bloods to [facility]. ED MD made aware. Hospitalist informed as well. Labs sent to the lab. Updated family on this.  
US at bedside.  
provider updated patient and family on plan of care   
All other components within normal limits   BRAIN NATRIURETIC PEPTIDE - Abnormal; Notable for the following components:    NT Pro-BNP 10,115 (*)     All other components within normal limits       Background  Allergies: No Known Allergies  History:   Past Medical History:   Diagnosis Date    Anxiety     Arthritis     Asthma     CAD (coronary artery disease)     Cancer (HCC)     Depression     Hypertension     Multiple myeloma (HCC)        Assessment  Vitals: MEWS Score: 2  Level of Consciousness: Alert (0)   Vitals:    10/16/24 1830 10/16/24 2325 10/16/24 2330 10/16/24 2339   BP: 133/86 (!) 144/85 124/85 132/75   Pulse: 74 89 80    Resp: 13 18 15    Temp:       TempSrc:       SpO2:  100% 100%    Weight:       Height:         Deterioration Index (DI): Deterioration Index: 25.59  Deterioration Index (DI) Interventions Performed:    O2 Flow Rate:    O2 Device: O2 Device: None (Room air)  Cardiac Rhythm:    Critical Lab Results: [unfilled]  Cultures: Cultures:  NIH Score: NIH     Active LDA's:   Peripheral IV 10/16/24 Right Forearm (Active)     Active Central Lines:                          Active Wounds:    Active Zimmerman's:    Active Feeding Tubes:      Administered Medications:   Medications   0.9 % sodium chloride infusion (has no administration in time range)   sodium chloride flush 0.9 % injection 5-40 mL (10 mLs IntraVENous Given 10/16/24 2344)   sodium chloride flush 0.9 % injection 5-40 mL (has no administration in time range)   0.9 % sodium chloride infusion (has no administration in time range)   ondansetron (ZOFRAN-ODT) disintegrating tablet 4 mg (has no administration in time range)     Or   ondansetron (ZOFRAN) injection 4 mg (has no administration in time range)   polyethylene glycol (GLYCOLAX) packet 17 g (has no administration in time range)   acetaminophen (TYLENOL) tablet 650 mg (has no administration in time range)     Or   acetaminophen (TYLENOL) suppository 650 mg (has no administration in time range)

## 2024-10-17 NOTE — NURSE NAVIGATOR
Heart Failure Education/Evaluation/Recommendations    Code Status: Full Code    Cardiology Consulted: Yes    Admitted due to running out of meds per patient. RN-NN recommends Meds to Bed and setting up Home Health and Follow-up appts.  RN-NN also recommends Palliative and Hospice discussion since the patient mets criteria.        RN-NN defers Education to Nursing Staff.       Patient Denied having a scale that works.   RN-NN provided Scale and Pill Box        Discharge Disposition: To Be Determined      RN-NN provided: Diet, Symptoms, Medication compliance, and symptom reporting education to the patient. Pt verbalized understanding      RECOMMENDATIONS:  Patient Follow-up with Cardiology within 1 week, if appointment available, if not schedule soonest appointment available.  Patient should follow-up with PCP within 1 week if Cardiology is not available and within 1-2 weeks if cardiology is available.  Meds to Bed Recommended  Advanced Care Planning should be discussed.        **RN-NN sets-up Meds to Bed and gives Scale while D/C Disposition to be determined.

## 2024-10-17 NOTE — CONSENT
Informed Consent for Blood Component Transfusion Note    I have discussed with the patient the rationale for blood component transfusion; its benefits in treating or preventing fatigue, organ damage, or death; and its risk which includes mild transfusion reactions, rare risk of blood borne infection, or more serious but rare reactions. I have discussed the alternatives to transfusion, including the risk and consequences of not receiving transfusion. The patient had an opportunity to ask questions and had agreed to proceed with transfusion of blood components.    Electronically signed by Misha Mazariegos MD on 10/16/24 at 8:17 PM EDT

## 2024-10-18 ENCOUNTER — ANESTHESIA (OUTPATIENT)
Facility: HOSPITAL | Age: 79
End: 2024-10-18
Payer: MEDICARE

## 2024-10-18 ENCOUNTER — ANESTHESIA EVENT (OUTPATIENT)
Facility: HOSPITAL | Age: 79
End: 2024-10-18
Payer: MEDICARE

## 2024-10-18 LAB
25(OH)D3 SERPL-MCNC: 95.9 NG/ML (ref 30–100)
ALBUMIN SERPL-MCNC: 1.9 G/DL (ref 3.5–5)
ALBUMIN/GLOB SERPL: 0.7 (ref 1.1–2.2)
ALP SERPL-CCNC: 123 U/L (ref 45–117)
ALT SERPL-CCNC: 15 U/L (ref 12–78)
ANION GAP SERPL CALC-SCNC: 8 MMOL/L (ref 2–12)
AST SERPL W P-5'-P-CCNC: 10 U/L (ref 15–37)
BASOPHILS # BLD: 0.1 K/UL (ref 0–0.1)
BASOPHILS NFR BLD: 4 % (ref 0–1)
BILIRUB SERPL-MCNC: 0.6 MG/DL (ref 0.2–1)
BUN SERPL-MCNC: 41 MG/DL (ref 6–20)
BUN/CREAT SERPL: 17 (ref 12–20)
CA-I BLD-MCNC: 6.4 MG/DL (ref 8.5–10.1)
CA-I BLD-MCNC: 6.6 MG/DL (ref 8.5–10.1)
CHLORIDE SERPL-SCNC: 117 MMOL/L (ref 97–108)
CK SERPL-CCNC: 96 U/L (ref 26–192)
CO2 SERPL-SCNC: 17 MMOL/L (ref 21–32)
CREAT SERPL-MCNC: 2.44 MG/DL (ref 0.55–1.02)
CREAT UR-MCNC: 22 MG/DL
DIFFERENTIAL METHOD BLD: ABNORMAL
EOSINOPHIL # BLD: 0 K/UL (ref 0–0.4)
EOSINOPHIL NFR BLD: 0 % (ref 0–7)
ERYTHROCYTE [DISTWIDTH] IN BLOOD BY AUTOMATED COUNT: 23.6 % (ref 11.5–14.5)
FERRITIN SERPL-MCNC: 342 NG/ML (ref 8–252)
FOLATE SERPL-MCNC: 15.2 NG/ML (ref 5–21)
GLOBULIN SER CALC-MCNC: 2.9 G/DL (ref 2–4)
GLUCOSE SERPL-MCNC: 68 MG/DL (ref 65–100)
HCT VFR BLD AUTO: 22.8 % (ref 35–47)
HGB BLD-MCNC: 7.8 G/DL (ref 11.5–16)
IMM GRANULOCYTES # BLD AUTO: 0 K/UL
IMM GRANULOCYTES NFR BLD AUTO: 0 %
IRON SATN MFR SERPL: 94 % (ref 20–50)
IRON SERPL-MCNC: 72 UG/DL (ref 35–150)
LYMPHOCYTES # BLD: 0.8 K/UL (ref 0.8–3.5)
LYMPHOCYTES NFR BLD: 48 % (ref 12–49)
MCH RBC QN AUTO: 33.8 PG (ref 26–34)
MCHC RBC AUTO-ENTMCNC: 34.2 G/DL (ref 30–36.5)
MCV RBC AUTO: 98.7 FL (ref 80–99)
MONOCYTES # BLD: 0.1 K/UL (ref 0–1)
MONOCYTES NFR BLD: 8 % (ref 5–13)
NEUTS SEG # BLD: 0.6 K/UL (ref 1.8–8)
NEUTS SEG NFR BLD: 40 % (ref 32–75)
NRBC # BLD: 0.02 K/UL (ref 0–0.01)
NRBC BLD-RTO: 1.3 PER 100 WBC
PHOSPHATE SERPL-MCNC: 3.2 MG/DL (ref 2.6–4.7)
PLATELET # BLD AUTO: 112 K/UL (ref 150–400)
PMV BLD AUTO: 11.6 FL (ref 8.9–12.9)
POTASSIUM SERPL-SCNC: 3.5 MMOL/L (ref 3.5–5.1)
PROT SERPL-MCNC: 4.8 G/DL (ref 6.4–8.2)
PROT UR-MCNC: 24 MG/DL (ref 0–11.9)
PROT/CREAT UR-RTO: 1.1
PTH-INTACT SERPL-MCNC: >2000 PG/ML (ref 18.4–88)
RBC # BLD AUTO: 2.31 M/UL (ref 3.8–5.2)
RBC MORPH BLD: ABNORMAL
SODIUM SERPL-SCNC: 142 MMOL/L (ref 136–145)
TIBC SERPL-MCNC: 77 UG/DL (ref 250–450)
VIT B12 SERPL-MCNC: >2000 PG/ML (ref 193–986)
WBC # BLD AUTO: 1.6 K/UL (ref 3.6–11)

## 2024-10-18 PROCEDURE — 82570 ASSAY OF URINE CREATININE: CPT

## 2024-10-18 PROCEDURE — 6370000000 HC RX 637 (ALT 250 FOR IP)

## 2024-10-18 PROCEDURE — 84156 ASSAY OF PROTEIN URINE: CPT

## 2024-10-18 PROCEDURE — 94761 N-INVAS EAR/PLS OXIMETRY MLT: CPT

## 2024-10-18 PROCEDURE — 2700000000 HC OXYGEN THERAPY PER DAY

## 2024-10-18 PROCEDURE — 7100000011 HC PHASE II RECOVERY - ADDTL 15 MIN: Performed by: INTERNAL MEDICINE

## 2024-10-18 PROCEDURE — 80053 COMPREHEN METABOLIC PANEL: CPT

## 2024-10-18 PROCEDURE — 7100000010 HC PHASE II RECOVERY - FIRST 15 MIN: Performed by: INTERNAL MEDICINE

## 2024-10-18 PROCEDURE — 2060000000 HC ICU INTERMEDIATE R&B

## 2024-10-18 PROCEDURE — 84100 ASSAY OF PHOSPHORUS: CPT

## 2024-10-18 PROCEDURE — 6360000002 HC RX W HCPCS

## 2024-10-18 PROCEDURE — 82550 ASSAY OF CK (CPK): CPT

## 2024-10-18 PROCEDURE — 2580000003 HC RX 258: Performed by: INTERNAL MEDICINE

## 2024-10-18 PROCEDURE — 6360000002 HC RX W HCPCS: Performed by: NURSE ANESTHETIST, CERTIFIED REGISTERED

## 2024-10-18 PROCEDURE — 6360000002 HC RX W HCPCS: Performed by: PHYSICIAN ASSISTANT

## 2024-10-18 PROCEDURE — 3600007511: Performed by: INTERNAL MEDICINE

## 2024-10-18 PROCEDURE — 87077 CULTURE AEROBIC IDENTIFY: CPT

## 2024-10-18 PROCEDURE — 3700000000 HC ANESTHESIA ATTENDED CARE: Performed by: INTERNAL MEDICINE

## 2024-10-18 PROCEDURE — 36415 COLL VENOUS BLD VENIPUNCTURE: CPT

## 2024-10-18 PROCEDURE — 6370000000 HC RX 637 (ALT 250 FOR IP): Performed by: INTERNAL MEDICINE

## 2024-10-18 PROCEDURE — 2580000003 HC RX 258

## 2024-10-18 PROCEDURE — 6360000002 HC RX W HCPCS: Performed by: INTERNAL MEDICINE

## 2024-10-18 PROCEDURE — 87186 SC STD MICRODIL/AGAR DIL: CPT

## 2024-10-18 PROCEDURE — 3700000001 HC ADD 15 MINUTES (ANESTHESIA): Performed by: INTERNAL MEDICINE

## 2024-10-18 PROCEDURE — 87205 SMEAR GRAM STAIN: CPT

## 2024-10-18 PROCEDURE — 0DJ08ZZ INSPECTION OF UPPER INTESTINAL TRACT, VIA NATURAL OR ARTIFICIAL OPENING ENDOSCOPIC: ICD-10-PCS | Performed by: INTERNAL MEDICINE

## 2024-10-18 PROCEDURE — 3600007501: Performed by: INTERNAL MEDICINE

## 2024-10-18 PROCEDURE — 87070 CULTURE OTHR SPECIMN AEROBIC: CPT

## 2024-10-18 PROCEDURE — 94640 AIRWAY INHALATION TREATMENT: CPT

## 2024-10-18 PROCEDURE — 2500000003 HC RX 250 WO HCPCS

## 2024-10-18 PROCEDURE — 97530 THERAPEUTIC ACTIVITIES: CPT

## 2024-10-18 PROCEDURE — 85025 COMPLETE CBC W/AUTO DIFF WBC: CPT

## 2024-10-18 RX ORDER — SODIUM BICARBONATE 650 MG/1
1300 TABLET ORAL 2 TIMES DAILY
Status: DISCONTINUED | OUTPATIENT
Start: 2024-10-18 | End: 2024-10-23

## 2024-10-18 RX ORDER — PROPOFOL 10 MG/ML
INJECTION, EMULSION INTRAVENOUS
Status: DISCONTINUED | OUTPATIENT
Start: 2024-10-18 | End: 2024-10-18 | Stop reason: SDUPTHER

## 2024-10-18 RX ORDER — CALCIUM GLUCONATE 20 MG/ML
1000 INJECTION, SOLUTION INTRAVENOUS ONCE
Status: COMPLETED | OUTPATIENT
Start: 2024-10-18 | End: 2024-10-18

## 2024-10-18 RX ORDER — CALCITRIOL 0.25 UG/1
0.5 CAPSULE, LIQUID FILLED ORAL DAILY
Status: DISCONTINUED | OUTPATIENT
Start: 2024-10-18 | End: 2024-10-23 | Stop reason: HOSPADM

## 2024-10-18 RX ORDER — IPRATROPIUM BROMIDE AND ALBUTEROL SULFATE 2.5; .5 MG/3ML; MG/3ML
1 SOLUTION RESPIRATORY (INHALATION) EVERY 4 HOURS PRN
Status: DISCONTINUED | OUTPATIENT
Start: 2024-10-18 | End: 2024-10-23 | Stop reason: HOSPADM

## 2024-10-18 RX ORDER — CALCIUM CARBONATE 500 MG/1
1000 TABLET, CHEWABLE ORAL 2 TIMES DAILY
Status: DISCONTINUED | OUTPATIENT
Start: 2024-10-18 | End: 2024-10-23 | Stop reason: HOSPADM

## 2024-10-18 RX ADMIN — SODIUM CHLORIDE, PRESERVATIVE FREE 5 ML: 5 INJECTION INTRAVENOUS at 23:14

## 2024-10-18 RX ADMIN — Medication 2 PUFF: at 21:10

## 2024-10-18 RX ADMIN — MIRTAZAPINE 15 MG: 15 TABLET, FILM COATED ORAL at 23:13

## 2024-10-18 RX ADMIN — POLYETHYLENE GLYCOL 3350 17 G: 17 POWDER, FOR SOLUTION ORAL at 09:32

## 2024-10-18 RX ADMIN — SODIUM CHLORIDE, PRESERVATIVE FREE 10 ML: 5 INJECTION INTRAVENOUS at 09:21

## 2024-10-18 RX ADMIN — CALCIUM CARBONATE 1000 MG: 500 TABLET, CHEWABLE ORAL at 18:08

## 2024-10-18 RX ADMIN — SODIUM BICARBONATE 650 MG: 650 TABLET ORAL at 09:21

## 2024-10-18 RX ADMIN — PANTOPRAZOLE SODIUM 40 MG: 40 INJECTION, POWDER, FOR SOLUTION INTRAVENOUS at 09:21

## 2024-10-18 RX ADMIN — CALCIUM GLUCONATE 1000 MG: 20 INJECTION, SOLUTION INTRAVENOUS at 09:31

## 2024-10-18 RX ADMIN — FUROSEMIDE 40 MG: 10 INJECTION, SOLUTION INTRAMUSCULAR; INTRAVENOUS at 09:21

## 2024-10-18 RX ADMIN — POLYETHYLENE GLYCOL 3350 17 G: 17 POWDER, FOR SOLUTION ORAL at 23:14

## 2024-10-18 RX ADMIN — Medication 2 PUFF: at 11:25

## 2024-10-18 RX ADMIN — CALCITRIOL CAPSULES 0.25 MCG 0.5 MCG: 0.25 CAPSULE ORAL at 18:08

## 2024-10-18 RX ADMIN — PROPOFOL 30 MG: 10 INJECTION, EMULSION INTRAVENOUS at 16:22

## 2024-10-18 RX ADMIN — SODIUM BICARBONATE 50 MEQ: 84 INJECTION, SOLUTION INTRAVENOUS at 11:34

## 2024-10-18 RX ADMIN — SODIUM BICARBONATE 1300 MG: 650 TABLET ORAL at 23:13

## 2024-10-18 RX ADMIN — PROPOFOL 50 MG: 10 INJECTION, EMULSION INTRAVENOUS at 16:16

## 2024-10-18 RX ADMIN — Medication 2 PUFF: at 08:34

## 2024-10-18 RX ADMIN — FUROSEMIDE 40 MG: 10 INJECTION, SOLUTION INTRAMUSCULAR; INTRAVENOUS at 18:08

## 2024-10-18 RX ADMIN — CEFAZOLIN 2000 MG: 1 INJECTION, POWDER, FOR SOLUTION INTRAMUSCULAR; INTRAVENOUS at 02:19

## 2024-10-18 ASSESSMENT — PAIN - FUNCTIONAL ASSESSMENT: PAIN_FUNCTIONAL_ASSESSMENT: NONE - DENIES PAIN

## 2024-10-18 NOTE — ANESTHESIA PRE PROCEDURE
malignancy/cancer.                 Abdominal:             Vascular: negative vascular ROS.         Other Findings:             Anesthesia Plan      MAC and TIVA     ASA 3 - emergent     (Standard ASA Monitors)  Induction: intravenous.      Anesthetic plan and risks discussed with patient.      Plan discussed with CRNA.    Attending anesthesiologist reviewed and agrees with Preprocedure content                Reynold Atkins MD   10/18/2024

## 2024-10-18 NOTE — ANESTHESIA POSTPROCEDURE EVALUATION
Department of Anesthesiology  Postprocedure Note    Patient: Brenda Olivas  MRN: [id number]  YOB: 1945  Date of evaluation: 10/18/2024    Procedure Summary       Date: 10/18/24 Room / Location: [facility] 01 / [facility] ENDOSCOPY    Anesthesia Start: 1551 Anesthesia Stop: 1627    Procedure: ESOPHAGOGASTRODUODENOSCOPY Diagnosis:       Gastrointestinal hemorrhage, unspecified gastrointestinal hemorrhage type      (Gastrointestinal hemorrhage, unspecified gastrointestinal hemorrhage type [K92.2])    Surgeons: Jakub Baxter MD Responsible Provider: Reynold Atkins MD    Anesthesia Type: MAC, TIVA ASA Status: 3 - Emergent            Anesthesia Type: No value filed.    Sergio Phase I:      Sergio Phase II:      Anesthesia Post Evaluation    Patient location during evaluation: bedside (Endoscopy Unit)  Patient participation: complete - patient participated  Level of consciousness: sleepy but conscious  Pain score: 0  Airway patency: patent  Nausea & Vomiting: no nausea and no vomiting  Cardiovascular status: hemodynamically stable  Respiratory status: acceptable  Hydration status: stable  Comments: This patient remained on the stretcher.  The patient was handed off to the endoscopy nursing team.  All questions regarding pre-, intra-, and postoperative care were answered.  Multimodal analgesia pain management approach    No notable events documented.

## 2024-10-18 NOTE — CARE COORDINATION
Patient accepted at [facility] for SNF, patient may d/c over weekend, if d/c over weekend, CM to call Jaime at [facility] ([phone]) to obtain room number.    CM continues to follow and monitor for needs.

## 2024-10-19 ENCOUNTER — APPOINTMENT (OUTPATIENT)
Facility: HOSPITAL | Age: 79
DRG: 291 | End: 2024-10-19
Attending: INTERNAL MEDICINE
Payer: MEDICARE

## 2024-10-19 LAB
ALBUMIN SERPL-MCNC: 2 G/DL (ref 3.5–5)
ALBUMIN/GLOB SERPL: 0.6 (ref 1.1–2.2)
ALP SERPL-CCNC: 131 U/L (ref 45–117)
ALT SERPL-CCNC: <6 U/L (ref 12–78)
ANION GAP SERPL CALC-SCNC: 8 MMOL/L (ref 2–12)
AST SERPL W P-5'-P-CCNC: 7 U/L (ref 15–37)
BASOPHILS # BLD: 0 K/UL (ref 0–0.1)
BASOPHILS NFR BLD: 1 % (ref 0–1)
BILIRUB SERPL-MCNC: 0.5 MG/DL (ref 0.2–1)
BNP SERPL-MCNC: ABNORMAL PG/ML
BUN SERPL-MCNC: 42 MG/DL (ref 6–20)
BUN/CREAT SERPL: 17 (ref 12–20)
CA-I BLD-MCNC: 6.4 MG/DL (ref 8.5–10.1)
CHLORIDE SERPL-SCNC: 114 MMOL/L (ref 97–108)
CO2 SERPL-SCNC: 21 MMOL/L (ref 21–32)
CREAT SERPL-MCNC: 2.45 MG/DL (ref 0.55–1.02)
CRP SERPL-MCNC: 5.47 MG/DL (ref 0–0.3)
DIFFERENTIAL METHOD BLD: ABNORMAL
ECHO BSA: 1.58 M2
EOSINOPHIL # BLD: 0.1 K/UL (ref 0–0.4)
EOSINOPHIL NFR BLD: 5 % (ref 0–7)
ERYTHROCYTE [DISTWIDTH] IN BLOOD BY AUTOMATED COUNT: 23.4 % (ref 11.5–14.5)
GLOBULIN SER CALC-MCNC: 3.2 G/DL (ref 2–4)
GLUCOSE SERPL-MCNC: 72 MG/DL (ref 65–100)
HCT VFR BLD AUTO: 24.5 % (ref 35–47)
HGB BLD-MCNC: 8.5 G/DL (ref 11.5–16)
IMM GRANULOCYTES # BLD AUTO: 0 K/UL (ref 0–0.04)
IMM GRANULOCYTES NFR BLD AUTO: 2 % (ref 0–0.5)
LYMPHOCYTES # BLD: 0.9 K/UL (ref 0.8–3.5)
LYMPHOCYTES NFR BLD: 43 % (ref 12–49)
MCH RBC QN AUTO: 33.9 PG (ref 26–34)
MCHC RBC AUTO-ENTMCNC: 34.7 G/DL (ref 30–36.5)
MCV RBC AUTO: 97.6 FL (ref 80–99)
MONOCYTES # BLD: 0.5 K/UL (ref 0–1)
MONOCYTES NFR BLD: 23 % (ref 5–13)
NEUTS SEG # BLD: 0.5 K/UL (ref 1.8–8)
NEUTS SEG NFR BLD: 26 % (ref 32–75)
NRBC # BLD: 0 K/UL (ref 0–0.01)
NRBC BLD-RTO: 0 PER 100 WBC
PLATELET # BLD AUTO: 113 K/UL (ref 150–400)
PMV BLD AUTO: 11.3 FL (ref 8.9–12.9)
POTASSIUM SERPL-SCNC: 3.2 MMOL/L (ref 3.5–5.1)
PROCALCITONIN SERPL-MCNC: 0.17 NG/ML
PROT SERPL-MCNC: 5.2 G/DL (ref 6.4–8.2)
RBC # BLD AUTO: 2.51 M/UL (ref 3.8–5.2)
RBC MORPH BLD: ABNORMAL
SODIUM SERPL-SCNC: 143 MMOL/L (ref 136–145)
TRANSFERRIN SERPL-MCNC: 58 MG/DL (ref 192–364)
VAS LEFT ARM BP: 121 MMHG
VAS LEFT CCA DIST EDV: 20.2 CM/S
VAS LEFT CCA DIST PSV: 70.6 CM/S
VAS LEFT CCA PROX EDV: 13.4 CM/S
VAS LEFT CCA PROX PSV: 74.9 CM/S
VAS LEFT ECA EDV: 6.45 CM/S
VAS LEFT ECA PSV: 69.3 CM/S
VAS LEFT ICA DIST EDV: 48.3 CM/S
VAS LEFT ICA DIST PSV: 133 CM/S
VAS LEFT ICA PROX EDV: 15.8 CM/S
VAS LEFT ICA PROX PSV: 57.1 CM/S
VAS LEFT ICA/CCA PSV: 1.87
VAS LEFT VERTEBRAL EDV: 23.2 CM/S
VAS LEFT VERTEBRAL PSV: 64.7 CM/S
VAS RIGHT ARM BP: 120 MMHG
VAS RIGHT CCA DIST EDV: 22.2 CM/S
VAS RIGHT CCA DIST PSV: 65.4 CM/S
VAS RIGHT CCA PROX EDV: 15 CM/S
VAS RIGHT CCA PROX PSV: 70.2 CM/S
VAS RIGHT ECA EDV: 0.6 CM/S
VAS RIGHT ECA PSV: 45 CM/S
VAS RIGHT ICA DIST EDV: 28.3 CM/S
VAS RIGHT ICA DIST PSV: 93.5 CM/S
VAS RIGHT ICA PROX EDV: 31.2 CM/S
VAS RIGHT ICA PROX PSV: 81 CM/S
VAS RIGHT ICA/CCA PSV: 1.45
VAS RIGHT VERTEBRAL EDV: 10.3 CM/S
VAS RIGHT VERTEBRAL PSV: 48.7 CM/S
WBC # BLD AUTO: 2 K/UL (ref 3.6–11)

## 2024-10-19 PROCEDURE — 86140 C-REACTIVE PROTEIN: CPT

## 2024-10-19 PROCEDURE — 2580000003 HC RX 258: Performed by: INTERNAL MEDICINE

## 2024-10-19 PROCEDURE — 83880 ASSAY OF NATRIURETIC PEPTIDE: CPT

## 2024-10-19 PROCEDURE — 94760 N-INVAS EAR/PLS OXIMETRY 1: CPT

## 2024-10-19 PROCEDURE — 36415 COLL VENOUS BLD VENIPUNCTURE: CPT

## 2024-10-19 PROCEDURE — 80053 COMPREHEN METABOLIC PANEL: CPT

## 2024-10-19 PROCEDURE — 6370000000 HC RX 637 (ALT 250 FOR IP): Performed by: INTERNAL MEDICINE

## 2024-10-19 PROCEDURE — 2060000000 HC ICU INTERMEDIATE R&B

## 2024-10-19 PROCEDURE — 85025 COMPLETE CBC W/AUTO DIFF WBC: CPT

## 2024-10-19 PROCEDURE — 92610 EVALUATE SWALLOWING FUNCTION: CPT

## 2024-10-19 PROCEDURE — 6360000002 HC RX W HCPCS

## 2024-10-19 PROCEDURE — 94761 N-INVAS EAR/PLS OXIMETRY MLT: CPT

## 2024-10-19 PROCEDURE — 6370000000 HC RX 637 (ALT 250 FOR IP)

## 2024-10-19 PROCEDURE — 94640 AIRWAY INHALATION TREATMENT: CPT

## 2024-10-19 PROCEDURE — 84145 PROCALCITONIN (PCT): CPT

## 2024-10-19 PROCEDURE — 93880 EXTRACRANIAL BILAT STUDY: CPT

## 2024-10-19 PROCEDURE — 2700000000 HC OXYGEN THERAPY PER DAY

## 2024-10-19 PROCEDURE — 6360000002 HC RX W HCPCS: Performed by: INTERNAL MEDICINE

## 2024-10-19 PROCEDURE — 2580000003 HC RX 258

## 2024-10-19 RX ORDER — AMOXICILLIN AND CLAVULANATE POTASSIUM 500; 125 MG/1; MG/1
1 TABLET, FILM COATED ORAL EVERY 12 HOURS SCHEDULED
Status: DISCONTINUED | OUTPATIENT
Start: 2024-10-19 | End: 2024-10-21

## 2024-10-19 RX ORDER — FUROSEMIDE 10 MG/ML
20 INJECTION INTRAMUSCULAR; INTRAVENOUS 2 TIMES DAILY
Status: DISCONTINUED | OUTPATIENT
Start: 2024-10-19 | End: 2024-10-20

## 2024-10-19 RX ORDER — AZITHROMYCIN 500 MG/1
500 TABLET, FILM COATED ORAL DAILY
Status: DISCONTINUED | OUTPATIENT
Start: 2024-10-19 | End: 2024-10-23 | Stop reason: HOSPADM

## 2024-10-19 RX ORDER — POTASSIUM CHLORIDE 1500 MG/1
40 TABLET, EXTENDED RELEASE ORAL EVERY 4 HOURS
Status: COMPLETED | OUTPATIENT
Start: 2024-10-19 | End: 2024-10-19

## 2024-10-19 RX ORDER — CALCIUM GLUCONATE 20 MG/ML
2000 INJECTION, SOLUTION INTRAVENOUS ONCE
Status: COMPLETED | OUTPATIENT
Start: 2024-10-19 | End: 2024-10-19

## 2024-10-19 RX ADMIN — SODIUM CHLORIDE, PRESERVATIVE FREE 10 ML: 5 INJECTION INTRAVENOUS at 20:51

## 2024-10-19 RX ADMIN — CALCIUM CARBONATE 1000 MG: 500 TABLET, CHEWABLE ORAL at 10:25

## 2024-10-19 RX ADMIN — AMOXICILLIN AND CLAVULANATE POTASSIUM 1 TABLET: 500; 125 TABLET, FILM COATED ORAL at 10:34

## 2024-10-19 RX ADMIN — Medication 2 PUFF: at 15:13

## 2024-10-19 RX ADMIN — SODIUM BICARBONATE 1300 MG: 650 TABLET ORAL at 20:41

## 2024-10-19 RX ADMIN — CALCITRIOL CAPSULES 0.25 MCG 0.5 MCG: 0.25 CAPSULE ORAL at 10:25

## 2024-10-19 RX ADMIN — POLYETHYLENE GLYCOL 3350 17 G: 17 POWDER, FOR SOLUTION ORAL at 20:51

## 2024-10-19 RX ADMIN — SODIUM BICARBONATE 1300 MG: 650 TABLET ORAL at 10:26

## 2024-10-19 RX ADMIN — CEFAZOLIN 2000 MG: 1 INJECTION, POWDER, FOR SOLUTION INTRAMUSCULAR; INTRAVENOUS at 04:42

## 2024-10-19 RX ADMIN — CALCIUM CARBONATE 1000 MG: 500 TABLET, CHEWABLE ORAL at 17:14

## 2024-10-19 RX ADMIN — AMOXICILLIN AND CLAVULANATE POTASSIUM 1 TABLET: 500; 125 TABLET, FILM COATED ORAL at 20:41

## 2024-10-19 RX ADMIN — Medication 2 PUFF: at 07:34

## 2024-10-19 RX ADMIN — FUROSEMIDE 20 MG: 10 INJECTION, SOLUTION INTRAMUSCULAR; INTRAVENOUS at 17:14

## 2024-10-19 RX ADMIN — IPRATROPIUM BROMIDE AND ALBUTEROL SULFATE 1 DOSE: .5; 2.5 SOLUTION RESPIRATORY (INHALATION) at 07:34

## 2024-10-19 RX ADMIN — Medication 2 PUFF: at 21:39

## 2024-10-19 RX ADMIN — POTASSIUM CHLORIDE 40 MEQ: 1500 TABLET, EXTENDED RELEASE ORAL at 15:16

## 2024-10-19 RX ADMIN — POLYETHYLENE GLYCOL 3350 17 G: 17 POWDER, FOR SOLUTION ORAL at 10:26

## 2024-10-19 RX ADMIN — MIRTAZAPINE 15 MG: 15 TABLET, FILM COATED ORAL at 20:41

## 2024-10-19 RX ADMIN — Medication 2 PUFF: at 21:33

## 2024-10-19 RX ADMIN — SODIUM CHLORIDE, PRESERVATIVE FREE 10 ML: 5 INJECTION INTRAVENOUS at 10:26

## 2024-10-19 RX ADMIN — CALCIUM GLUCONATE 2000 MG: 20 INJECTION, SOLUTION INTRAVENOUS at 14:08

## 2024-10-19 RX ADMIN — POTASSIUM CHLORIDE 40 MEQ: 1500 TABLET, EXTENDED RELEASE ORAL at 10:26

## 2024-10-19 RX ADMIN — AZITHROMYCIN DIHYDRATE 500 MG: 500 TABLET ORAL at 10:34

## 2024-10-20 ENCOUNTER — APPOINTMENT (OUTPATIENT)
Facility: HOSPITAL | Age: 79
DRG: 291 | End: 2024-10-20
Attending: INTERNAL MEDICINE
Payer: MEDICARE

## 2024-10-20 LAB
1,25(OH)2D3 SERPL-MCNC: 123 PG/ML (ref 24.8–81.5)
ABO + RH BLD: NORMAL
ANION GAP SERPL CALC-SCNC: 6 MMOL/L (ref 2–12)
BASOPHILS # BLD: 0 K/UL (ref 0–0.1)
BASOPHILS NFR BLD: 1 % (ref 0–1)
BLD PROD TYP BPU: NORMAL
BLOOD BANK BLOOD PRODUCT EXPIRATION DATE: NORMAL
BLOOD BANK DISPENSE STATUS: NORMAL
BLOOD BANK ISBT PRODUCT BLOOD TYPE: 600
BLOOD BANK PRODUCT CODE: NORMAL
BLOOD BANK UNIT TYPE AND RH: NORMAL
BLOOD GROUP ANTIBODIES SERPL: NORMAL
BLOOD GROUP ANTIBODIES SERPL: POSITIVE
BPU ID: NORMAL
BUN SERPL-MCNC: 40 MG/DL (ref 6–20)
BUN/CREAT SERPL: 17 (ref 12–20)
CA-I BLD-MCNC: 7.3 MG/DL (ref 8.5–10.1)
CHLORIDE SERPL-SCNC: 116 MMOL/L (ref 97–108)
CO2 SERPL-SCNC: 20 MMOL/L (ref 21–32)
CREAT SERPL-MCNC: 2.36 MG/DL (ref 0.55–1.02)
CROSSMATCH RESULT: NORMAL
CRP SERPL-MCNC: 4.14 MG/DL (ref 0–0.3)
DIFFERENTIAL METHOD BLD: ABNORMAL
EOSINOPHIL # BLD: 0.2 K/UL (ref 0–0.4)
EOSINOPHIL NFR BLD: 8 % (ref 0–7)
ERYTHROCYTE [DISTWIDTH] IN BLOOD BY AUTOMATED COUNT: 23.4 % (ref 11.5–14.5)
GLUCOSE SERPL-MCNC: 63 MG/DL (ref 65–100)
HCT VFR BLD AUTO: 25.1 % (ref 35–47)
HGB BLD-MCNC: 8.2 G/DL (ref 11.5–16)
IMM GRANULOCYTES # BLD AUTO: 0 K/UL (ref 0–0.04)
IMM GRANULOCYTES NFR BLD AUTO: 1 % (ref 0–0.5)
LYMPHOCYTES # BLD: 1 K/UL (ref 0.8–3.5)
LYMPHOCYTES NFR BLD: 43 % (ref 12–49)
MCH RBC QN AUTO: 33.6 PG (ref 26–34)
MCHC RBC AUTO-ENTMCNC: 32.7 G/DL (ref 30–36.5)
MCV RBC AUTO: 102.9 FL (ref 80–99)
MONOCYTES # BLD: 0.4 K/UL (ref 0–1)
MONOCYTES NFR BLD: 21 % (ref 5–13)
NEUTS SEG # BLD: 0.5 K/UL (ref 1.8–8)
NEUTS SEG NFR BLD: 26 % (ref 32–75)
NRBC # BLD: 0.02 K/UL (ref 0–0.01)
NRBC BLD-RTO: 1 PER 100 WBC
PLATELET # BLD AUTO: 122 K/UL (ref 150–400)
PMV BLD AUTO: 11.5 FL (ref 8.9–12.9)
POTASSIUM SERPL-SCNC: 4.4 MMOL/L (ref 3.5–5.1)
RBC # BLD AUTO: 2.44 M/UL (ref 3.8–5.2)
RBC MORPH BLD: ABNORMAL
RBC MORPH BLD: ABNORMAL
SODIUM SERPL-SCNC: 142 MMOL/L (ref 136–145)
SPECIMEN EXP DATE BLD: NORMAL
TRANSFUSION STATUS PATIENT QL: NORMAL
UNIT DIVISION: 0
UNIT ISSUE DATE/TIME: NORMAL
WBC # BLD AUTO: 2.1 K/UL (ref 3.6–11)

## 2024-10-20 PROCEDURE — 80048 BASIC METABOLIC PNL TOTAL CA: CPT

## 2024-10-20 PROCEDURE — 6370000000 HC RX 637 (ALT 250 FOR IP): Performed by: INTERNAL MEDICINE

## 2024-10-20 PROCEDURE — 36415 COLL VENOUS BLD VENIPUNCTURE: CPT

## 2024-10-20 PROCEDURE — 94640 AIRWAY INHALATION TREATMENT: CPT

## 2024-10-20 PROCEDURE — 2700000000 HC OXYGEN THERAPY PER DAY

## 2024-10-20 PROCEDURE — 86140 C-REACTIVE PROTEIN: CPT

## 2024-10-20 PROCEDURE — 6370000000 HC RX 637 (ALT 250 FOR IP)

## 2024-10-20 PROCEDURE — 85025 COMPLETE CBC W/AUTO DIFF WBC: CPT

## 2024-10-20 PROCEDURE — 2580000003 HC RX 258

## 2024-10-20 PROCEDURE — 2060000000 HC ICU INTERMEDIATE R&B

## 2024-10-20 PROCEDURE — 6360000002 HC RX W HCPCS: Performed by: INTERNAL MEDICINE

## 2024-10-20 PROCEDURE — 94761 N-INVAS EAR/PLS OXIMETRY MLT: CPT

## 2024-10-20 RX ORDER — FUROSEMIDE 10 MG/ML
40 INJECTION INTRAMUSCULAR; INTRAVENOUS 2 TIMES DAILY
Status: DISCONTINUED | OUTPATIENT
Start: 2024-10-20 | End: 2024-10-23 | Stop reason: HOSPADM

## 2024-10-20 RX ORDER — ALBUTEROL SULFATE 90 UG/1
2 INHALANT RESPIRATORY (INHALATION)
Status: DISCONTINUED | OUTPATIENT
Start: 2024-10-20 | End: 2024-10-23 | Stop reason: HOSPADM

## 2024-10-20 RX ADMIN — CALCIUM CARBONATE 1000 MG: 500 TABLET, CHEWABLE ORAL at 09:12

## 2024-10-20 RX ADMIN — FUROSEMIDE 40 MG: 10 INJECTION, SOLUTION INTRAMUSCULAR; INTRAVENOUS at 17:57

## 2024-10-20 RX ADMIN — SODIUM BICARBONATE 1300 MG: 650 TABLET ORAL at 09:11

## 2024-10-20 RX ADMIN — AMOXICILLIN AND CLAVULANATE POTASSIUM 1 TABLET: 500; 125 TABLET, FILM COATED ORAL at 20:39

## 2024-10-20 RX ADMIN — POLYETHYLENE GLYCOL 3350 17 G: 17 POWDER, FOR SOLUTION ORAL at 20:40

## 2024-10-20 RX ADMIN — AMOXICILLIN AND CLAVULANATE POTASSIUM 1 TABLET: 500; 125 TABLET, FILM COATED ORAL at 09:22

## 2024-10-20 RX ADMIN — Medication 2 PUFF: at 09:56

## 2024-10-20 RX ADMIN — SODIUM BICARBONATE 1300 MG: 650 TABLET ORAL at 20:39

## 2024-10-20 RX ADMIN — SODIUM CHLORIDE, PRESERVATIVE FREE 10 ML: 5 INJECTION INTRAVENOUS at 20:40

## 2024-10-20 RX ADMIN — AZITHROMYCIN DIHYDRATE 500 MG: 500 TABLET ORAL at 09:12

## 2024-10-20 RX ADMIN — Medication 2 PUFF: at 20:49

## 2024-10-20 RX ADMIN — MIRTAZAPINE 15 MG: 15 TABLET, FILM COATED ORAL at 20:39

## 2024-10-20 RX ADMIN — CALCIUM CARBONATE 1000 MG: 500 TABLET, CHEWABLE ORAL at 17:57

## 2024-10-20 RX ADMIN — CALCITRIOL CAPSULES 0.25 MCG 0.5 MCG: 0.25 CAPSULE ORAL at 09:11

## 2024-10-20 RX ADMIN — FUROSEMIDE 20 MG: 10 INJECTION, SOLUTION INTRAMUSCULAR; INTRAVENOUS at 09:12

## 2024-10-20 RX ADMIN — SODIUM CHLORIDE, PRESERVATIVE FREE 10 ML: 5 INJECTION INTRAVENOUS at 09:13

## 2024-10-20 RX ADMIN — POLYETHYLENE GLYCOL 3350 17 G: 17 POWDER, FOR SOLUTION ORAL at 09:12

## 2024-10-21 ENCOUNTER — HOSPITAL ENCOUNTER (INPATIENT)
Facility: HOSPITAL | Age: 79
Discharge: HOME | DRG: 291 | End: 2024-10-23
Attending: INTERNAL MEDICINE
Payer: MEDICARE

## 2024-10-21 LAB
ANION GAP SERPL CALC-SCNC: 7 MMOL/L (ref 2–12)
BASOPHILS # BLD: 0 K/UL (ref 0–0.1)
BASOPHILS NFR BLD: 0 % (ref 0–1)
BNP SERPL-MCNC: ABNORMAL PG/ML
BUN SERPL-MCNC: 36 MG/DL (ref 6–20)
BUN/CREAT SERPL: 17 (ref 12–20)
CA-I BLD-MCNC: 7.3 MG/DL (ref 8.5–10.1)
CHLORIDE SERPL-SCNC: 112 MMOL/L (ref 97–108)
CO2 SERPL-SCNC: 24 MMOL/L (ref 21–32)
CREAT SERPL-MCNC: 2.11 MG/DL (ref 0.55–1.02)
CRP SERPL-MCNC: 2.94 MG/DL (ref 0–0.3)
DIFFERENTIAL METHOD BLD: ABNORMAL
EOSINOPHIL # BLD: 0 K/UL (ref 0–0.4)
EOSINOPHIL NFR BLD: 2 % (ref 0–7)
ERYTHROCYTE [DISTWIDTH] IN BLOOD BY AUTOMATED COUNT: 22.5 % (ref 11.5–14.5)
GLUCOSE SERPL-MCNC: 72 MG/DL (ref 65–100)
HCT VFR BLD AUTO: 23.3 % (ref 35–47)
HGB BLD-MCNC: 7.7 G/DL (ref 11.5–16)
IMM GRANULOCYTES # BLD AUTO: 0 K/UL
IMM GRANULOCYTES NFR BLD AUTO: 0 %
LYMPHOCYTES # BLD: 0.9 K/UL (ref 0.8–3.5)
LYMPHOCYTES NFR BLD: 46 % (ref 12–49)
MAGNESIUM SERPL-MCNC: 1.3 MG/DL (ref 1.6–2.4)
MCH RBC QN AUTO: 33.3 PG (ref 26–34)
MCHC RBC AUTO-ENTMCNC: 33 G/DL (ref 30–36.5)
MCV RBC AUTO: 100.9 FL (ref 80–99)
MONOCYTES # BLD: 0.5 K/UL (ref 0–1)
MONOCYTES NFR BLD: 24 % (ref 5–13)
NEUTS BAND NFR BLD MANUAL: 1 % (ref 0–6)
NEUTS SEG # BLD: 0.5 K/UL (ref 1.8–8)
NEUTS SEG NFR BLD: 27 % (ref 32–75)
NRBC # BLD: 0 K/UL (ref 0–0.01)
NRBC BLD-RTO: 0 PER 100 WBC
PLATELET # BLD AUTO: 129 K/UL (ref 150–400)
PMV BLD AUTO: 11 FL (ref 8.9–12.9)
POTASSIUM SERPL-SCNC: 4 MMOL/L (ref 3.5–5.1)
RBC # BLD AUTO: 2.31 M/UL (ref 3.8–5.2)
RBC MORPH BLD: ABNORMAL
RBC MORPH BLD: ABNORMAL
SODIUM SERPL-SCNC: 143 MMOL/L (ref 136–145)
WBC # BLD AUTO: 1.9 K/UL (ref 3.6–11)

## 2024-10-21 PROCEDURE — 6370000000 HC RX 637 (ALT 250 FOR IP): Performed by: INTERNAL MEDICINE

## 2024-10-21 PROCEDURE — 94761 N-INVAS EAR/PLS OXIMETRY MLT: CPT

## 2024-10-21 PROCEDURE — 99223 1ST HOSP IP/OBS HIGH 75: CPT | Performed by: INTERNAL MEDICINE

## 2024-10-21 PROCEDURE — 85025 COMPLETE CBC W/AUTO DIFF WBC: CPT

## 2024-10-21 PROCEDURE — 6370000000 HC RX 637 (ALT 250 FOR IP)

## 2024-10-21 PROCEDURE — 86140 C-REACTIVE PROTEIN: CPT

## 2024-10-21 PROCEDURE — 2700000000 HC OXYGEN THERAPY PER DAY

## 2024-10-21 PROCEDURE — 92526 ORAL FUNCTION THERAPY: CPT

## 2024-10-21 PROCEDURE — 6360000002 HC RX W HCPCS: Performed by: INTERNAL MEDICINE

## 2024-10-21 PROCEDURE — 6360000002 HC RX W HCPCS

## 2024-10-21 PROCEDURE — 83735 ASSAY OF MAGNESIUM: CPT

## 2024-10-21 PROCEDURE — 36415 COLL VENOUS BLD VENIPUNCTURE: CPT

## 2024-10-21 PROCEDURE — 93922 UPR/L XTREMITY ART 2 LEVELS: CPT

## 2024-10-21 PROCEDURE — 2580000003 HC RX 258

## 2024-10-21 PROCEDURE — 2060000000 HC ICU INTERMEDIATE R&B

## 2024-10-21 PROCEDURE — 2500000003 HC RX 250 WO HCPCS: Performed by: STUDENT IN AN ORGANIZED HEALTH CARE EDUCATION/TRAINING PROGRAM

## 2024-10-21 PROCEDURE — 94640 AIRWAY INHALATION TREATMENT: CPT

## 2024-10-21 PROCEDURE — 83880 ASSAY OF NATRIURETIC PEPTIDE: CPT

## 2024-10-21 PROCEDURE — 80048 BASIC METABOLIC PNL TOTAL CA: CPT

## 2024-10-21 PROCEDURE — 2580000003 HC RX 258: Performed by: INTERNAL MEDICINE

## 2024-10-21 RX ORDER — SODIUM BICARBONATE 650 MG/1
1300 TABLET ORAL 2 TIMES DAILY
Qty: 120 TABLET | Refills: 2 | Status: CANCELLED | OUTPATIENT
Start: 2024-10-21 | End: 2025-01-19

## 2024-10-21 RX ORDER — MAGNESIUM SULFATE HEPTAHYDRATE 40 MG/ML
2000 INJECTION, SOLUTION INTRAVENOUS 2 TIMES DAILY
Status: DISCONTINUED | OUTPATIENT
Start: 2024-10-21 | End: 2024-10-22

## 2024-10-21 RX ADMIN — AZITHROMYCIN DIHYDRATE 500 MG: 500 TABLET ORAL at 08:50

## 2024-10-21 RX ADMIN — Medication 2 PUFF: at 08:31

## 2024-10-21 RX ADMIN — FUROSEMIDE 40 MG: 10 INJECTION, SOLUTION INTRAMUSCULAR; INTRAVENOUS at 08:51

## 2024-10-21 RX ADMIN — Medication 2 PUFF: at 19:51

## 2024-10-21 RX ADMIN — SODIUM BICARBONATE 1300 MG: 650 TABLET ORAL at 08:51

## 2024-10-21 RX ADMIN — FUROSEMIDE 40 MG: 10 INJECTION, SOLUTION INTRAMUSCULAR; INTRAVENOUS at 18:19

## 2024-10-21 RX ADMIN — MAGNESIUM SULFATE HEPTAHYDRATE 2000 MG: 40 INJECTION, SOLUTION INTRAVENOUS at 20:06

## 2024-10-21 RX ADMIN — MIRTAZAPINE 15 MG: 15 TABLET, FILM COATED ORAL at 20:05

## 2024-10-21 RX ADMIN — CALCIUM CARBONATE 1000 MG: 500 TABLET, CHEWABLE ORAL at 18:19

## 2024-10-21 RX ADMIN — SODIUM BICARBONATE 1300 MG: 650 TABLET ORAL at 20:05

## 2024-10-21 RX ADMIN — CALCITRIOL CAPSULES 0.25 MCG 0.5 MCG: 0.25 CAPSULE ORAL at 08:51

## 2024-10-21 RX ADMIN — CALCIUM CARBONATE 1000 MG: 500 TABLET, CHEWABLE ORAL at 08:50

## 2024-10-21 RX ADMIN — SODIUM CHLORIDE, PRESERVATIVE FREE 10 ML: 5 INJECTION INTRAVENOUS at 08:51

## 2024-10-21 RX ADMIN — PIPERACILLIN SODIUM AND TAZOBACTAM SODIUM 3375 MG: 3; .375 INJECTION, POWDER, LYOPHILIZED, FOR SOLUTION INTRAVENOUS at 18:18

## 2024-10-21 RX ADMIN — POLYETHYLENE GLYCOL 3350 17 G: 17 POWDER, FOR SOLUTION ORAL at 08:51

## 2024-10-21 RX ADMIN — ONDANSETRON 4 MG: 2 INJECTION INTRAMUSCULAR; INTRAVENOUS at 20:05

## 2024-10-21 RX ADMIN — AMOXICILLIN AND CLAVULANATE POTASSIUM 1 TABLET: 500; 125 TABLET, FILM COATED ORAL at 08:56

## 2024-10-21 ASSESSMENT — ENCOUNTER SYMPTOMS
GASTROINTESTINAL NEGATIVE: 1
COUGH: 0
SHORTNESS OF BREATH: 0
EYES NEGATIVE: 1

## 2024-10-21 NOTE — CARE COORDINATION
DCP remains for patient to d/c from  to [facility] for SNF, patient will not require insurance auth prior to d/c.    Clinical updates sent via [employer].     continues to follow and monitor for needs.

## 2024-10-21 NOTE — CONSULTS
CARDIOLOGY CONSULTATION    REASON FOR CONSULT: CHF exacerbation    REQUESTING PROVIDER: Dr. Ortega    CHIEF COMPLAINT:  Shortness of breath, weakness    HISTORY OF PRESENT ILLNESS:  Brenda Olivas is a 79 y.o. year-old female with past medical history significant for Multiple myeloma on chemotherapy, CKD stage III, CAD status post PCI, A-fib status post ablation, GERD, heart failure with preserved EF, history of pelvic fracture status post right hip replacement, right lower extremity wound who was evaluated today due to CHF. Patient presented to the ED due to complaints of shortness of breath, weakness, and near syncope. Patient reports about 3 weeks ago she slipped off her recliner and hit her right lower extremity.  Since then she has a wound on her leg and has stopped taking her heart failure medications as they were making her urinate a lot and she could not tolerate getting up to the bathroom that often. Shortness of breath became gradually worse. Seen today sitting upright in bed. Denies chest pain. Patient is well known to our practice, recent testing as below.     -PET CT Myocardial Perfusion Imaging (03/13/2024): Myocardial perfusion imaging and ECG stress test was normal. Overall left ventricular systolic function was normal without regional wall motion abnormalities. The left ventricular ejection fraction was 70 %.    - Transthoracic Echocardiogram (02/12/2024): 1. The left ventricle demonstrates normal cavity size, wall dimensions, wall motion and systolic function. LV Ejection Fraction is 60-65 %. Left ventricular diastolic parameters are consistent with Grade I diastolic dysfunction (impaired relaxation).  There is mild mitral valve regurgitation.  There is trace tricuspid regurgitation.    -Cardiac catheterization (03/28/2014): No angiographically significant coronary disease. Normal systolic function. Estimated ejection fraction 65%.     Records from hospital admission course thus far reviewed.  
Consult Note            Date:10/21/2024        Patient Name:Brenda Olivas     YOB: 1945     Age:79 y.o.    Consults Infectious Disease    Chief Complaint     Chief Complaint   Patient presents with    Shortness of Breath           Fatigue    Near Syncope      Wound culture with Pseudomonas putida    History Obtained From   patient    History of Present Illness    This is a 79 year old female with multiple myeloma who initially presented to ED with syncope and SOB. On exam she had bilateral lower extremity edema with a necrotic wound on the right lateral leg with surrounding erythema and tenderness. She was leukopenic, attributed to chemotherapy. Procal and CRP were elevated. CXR was normal. XR right leg showed diffuse soft tissue swelling but no bony abnormality. CT Head showed only left sphenoid sinus disease. CT CHEST showed nodular groundglass opacities, patulous esophagus and trace ascites. Wound culture from right leg now growing Pseudomonas putida. ID has been consulted for this reason. She is currently on Azithromycin and Zosyn for pneumonia.      Patient is resting comfortably.  She affirms occasionally productive and states that at times she does feel as if she is choking on her food.  States that leg wound resulted from blunt trauma about 7 weeks ago.  Past Medical History     Past Medical History:   Diagnosis Date    Anxiety     Arthritis     Asthma     CAD (coronary artery disease)     Cancer (HCC)     multiple myeloma, currenly getting chemo po    Depression     Hypertension     Multiple myeloma (HCC)         Past Surgical History     Past Surgical History:   Procedure Laterality Date     SECTION N/A     GASTRIC BYPASS SURGERY N/A     HYSTEROTOMY N/A     uterus removed    JOINT REPLACEMENT Right     hip    ROTATOR CUFF REPAIR Left     TOTAL KNEE ARTHROPLASTY Right     UPPER GASTROINTESTINAL ENDOSCOPY N/A 2024    EGD BIOPSY performed by Tulio Gamboa MD at [facility] 
Gastroenterology Consult     Referring Physician: Aida Frazier MD     Consult Date: 10/17/2024     Subjective:     Patient was admitted to the hospital for treatment of congestive heart failure and she has multiple medical problems including multiple myeloma and chronic kidney disease she also has history of gastric bypass in the remote past.  She has had an upper endoscopy in 2024 at a different hospital.  She also had a Cologuard test that was negative in .  She denies any abdominal pain nausea vomiting she does not have any overt bleeding.    Past Medical History:   Diagnosis Date    Anxiety     Arthritis     Asthma     CAD (coronary artery disease)     Cancer (HCC)     multiple myeloma, currenly getting chemo po    Depression     Hypertension     Multiple myeloma (HCC)      Past Surgical History:   Procedure Laterality Date     SECTION N/A     GASTRIC BYPASS SURGERY N/A     HYSTEROTOMY N/A     uterus removed    JOINT REPLACEMENT Right 2019    hip    ROTATOR CUFF REPAIR Left     TOTAL KNEE ARTHROPLASTY Right 2010    UPPER GASTROINTESTINAL ENDOSCOPY N/A 2024    EGD BIOPSY performed by Tulio Gamboa MD at [facility] ENDOSCOPY    US DRAIN SOFT TISSUE ABSCESS  10/16/2024    US DRAIN SOFT TISSUE ABSCESS 10/16/2024 [facility] RAD US      History reviewed. No pertinent family history.  Social History     Tobacco Use    Smoking status: Former     Current packs/day: 0.50     Types: Cigarettes    Smokeless tobacco: Never   Substance Use Topics    Alcohol use: Never      No Known Allergies  Current Facility-Administered Medications   Medication Dose Route Frequency    polyethylene glycol (GLYCOLAX) packet 17 g  17 g Oral BID    pomalidomide (POMALYST) chemo capsule 2 mg (Patient Supplied)  2 mg Oral Nightly    ceFAZolin (ANCEF) 2,000 mg in sterile water 20 mL IV syringe  2,000 mg IntraVENous Q12H    ipratropium 0.5 mg-albuterol 2.5 mg (DUONEB) 0.5-2.5 (3) MG/3ML nebulizer solution        sodium 
Nutrition Education    Educated on HF/Renal Diet  Learners: Patient  Readiness: Acceptance  Method: Explanation and Handout  Response: Verbalizes Understanding  Pt aware that lowering sodium intake was a dietary approach for management of both her kidney disease and HF. Pt somewhat knowledgeable of where sodium is found in the diet and able to find sodium on nutrition facts label. D/w pt some changes she plans to make after discharge including buying salt-free seasoning blends and switching to low sodium canned goods. Pt w/o addl questions at this time. Handouts left for review.  Contact name and number provided.    Marcelina Sosa RD  Contact Number: [phone]    
Patient currently off the floor for procedure. Hematology/oncology service will evaluate her tomorrow.  
baseline  Will continue to monitor renal functions and adjust management as needed    2. Chronic kidney disease: probably has stage 4 chronic kidney disease   eGFR has been in range of CKD stage 4, most recent creatinine in September 2024 was 1.96  Renal osteodystrophy:  Corrected calcium is 8.2  Will get phosphorus level, intact PTH level, vitamin D levels to assess for renal osteodystrophy related to chronic kidney disease  We will continue to monitor renal functions to assess the baseline    3. Hypertension: Controlled  Not on any blood pressure medication  Advised patient to maintain salt restriction   Will continue to monitor blood pressures and adjust antihypertensive regimen as needed.    3. Metabolic acidosis: Secondary to ASHANTI on CKD  CO2 level is 16, labs pending today  Will add sodium bicarbonate 650 mg TID  Will continue to monitor Co2 levels    4. Anemia: Secondary to multiple myeloma and chronic kidney disease  Hemoglobin 6.7 on admission, received transfusion, labs pending today  Recommend to check iron studies to rule out iron deficiency  Recommend to give epogen 10,000 units qweekly  Rule out GI bleed, check stool for occult blood  Continue to monitor H&H    5. Acute hypoxic respiratory failure secondary to acute on chronic heart failure  Fluid overload/lower extremity edema:   Elevated proBNP on admission of > 10K  No pulmonary edema on chest x-ray  Continue Lasix 40 mg IV BID  Advised to maintain p.o. fluid restriction of 1200 ml per day and and salt restriction, check I/Os, daily weights  Venous Dopplers of BLE pending  Will monitor fluid status clinically and adjust diuretics as needed.    6. Multiple myeloma  On Pomalyst    7.  Presyncope  CT head negative for any acute processes      Signed: CATHIE Haley - CNP    Addendum:  Rounds made along with Stephanie Cho NP  Patient seen and examined at bedside,    Labs and treatments reviewed   Plan discussed with patient and NP   Reviewed NP's  
inadvertently transcribed.  Please excuse errors that have escaped final proofreading.     Signed By: PALMER DE LEÓN MD     October 19, 2024

## 2024-10-22 ENCOUNTER — APPOINTMENT (OUTPATIENT)
Facility: HOSPITAL | Age: 79
DRG: 291 | End: 2024-10-22
Payer: MEDICARE

## 2024-10-22 LAB
ANION GAP SERPL CALC-SCNC: 4 MMOL/L (ref 2–12)
BASOPHILS # BLD: 0 K/UL (ref 0–0.1)
BASOPHILS NFR BLD: 0 % (ref 0–1)
BUN SERPL-MCNC: 34 MG/DL (ref 6–20)
BUN/CREAT SERPL: 15 (ref 12–20)
CA-I BLD-MCNC: 7.8 MG/DL (ref 8.5–10.1)
CHLORIDE SERPL-SCNC: 110 MMOL/L (ref 97–108)
CO2 SERPL-SCNC: 27 MMOL/L (ref 21–32)
CREAT SERPL-MCNC: 2.32 MG/DL (ref 0.55–1.02)
CRP SERPL-MCNC: 2.02 MG/DL (ref 0–0.3)
DIFFERENTIAL METHOD BLD: ABNORMAL
ECHO BSA: 1.58 M2
EOSINOPHIL # BLD: 0.2 K/UL (ref 0–0.4)
EOSINOPHIL NFR BLD: 7 % (ref 0–7)
ERYTHROCYTE [DISTWIDTH] IN BLOOD BY AUTOMATED COUNT: 22.5 % (ref 11.5–14.5)
GLUCOSE SERPL-MCNC: 76 MG/DL (ref 65–100)
HCT VFR BLD AUTO: 24 % (ref 35–47)
HGB BLD-MCNC: 7.9 G/DL (ref 11.5–16)
IGA SERPL-MCNC: 568 MG/DL (ref 70–400)
IGG SERPL-MCNC: 758 MG/DL (ref 700–1600)
IGM SERPL-MCNC: 61 MG/DL (ref 40–230)
IMM GRANULOCYTES # BLD AUTO: 0 K/UL
IMM GRANULOCYTES NFR BLD AUTO: 0 %
LYMPHOCYTES # BLD: 1.1 K/UL (ref 0.8–3.5)
LYMPHOCYTES NFR BLD: 46 % (ref 12–49)
MAGNESIUM SERPL-MCNC: 2.9 MG/DL (ref 1.6–2.4)
MCH RBC QN AUTO: 34.1 PG (ref 26–34)
MCHC RBC AUTO-ENTMCNC: 32.9 G/DL (ref 30–36.5)
MCV RBC AUTO: 103.4 FL (ref 80–99)
METAMYELOCYTES NFR BLD MANUAL: 1 %
MONOCYTES # BLD: 0.1 K/UL (ref 0–1)
MONOCYTES NFR BLD: 6 % (ref 5–13)
MYELOCYTES NFR BLD MANUAL: 1 %
NEUTS BAND NFR BLD MANUAL: 1 % (ref 0–6)
NEUTS SEG # BLD: 0.9 K/UL (ref 1.8–8)
NEUTS SEG NFR BLD: 38 % (ref 32–75)
NRBC # BLD: 0 K/UL (ref 0–0.01)
NRBC BLD-RTO: 0 PER 100 WBC
PHOSPHATE SERPL-MCNC: 2.7 MG/DL (ref 2.6–4.7)
PLATELET # BLD AUTO: 139 K/UL (ref 150–400)
PMV BLD AUTO: 11.3 FL (ref 8.9–12.9)
POTASSIUM SERPL-SCNC: 4.3 MMOL/L (ref 3.5–5.1)
RBC # BLD AUTO: 2.32 M/UL (ref 3.8–5.2)
RBC MORPH BLD: ABNORMAL
RBC MORPH BLD: ABNORMAL
SODIUM SERPL-SCNC: 141 MMOL/L (ref 136–145)
VAS LEFT ARM BP: 119 MMHG
VAS RIGHT ABI: 1.58
VAS RIGHT ARM BP: 112 MMHG
VAS RIGHT DORSALIS PEDIS BP: 188 MMHG
WBC # BLD AUTO: 2.3 K/UL (ref 3.6–11)

## 2024-10-22 PROCEDURE — 2060000000 HC ICU INTERMEDIATE R&B

## 2024-10-22 PROCEDURE — 6370000000 HC RX 637 (ALT 250 FOR IP): Performed by: INTERNAL MEDICINE

## 2024-10-22 PROCEDURE — 94640 AIRWAY INHALATION TREATMENT: CPT

## 2024-10-22 PROCEDURE — 99232 SBSQ HOSP IP/OBS MODERATE 35: CPT | Performed by: INTERNAL MEDICINE

## 2024-10-22 PROCEDURE — 2580000003 HC RX 258

## 2024-10-22 PROCEDURE — 2500000003 HC RX 250 WO HCPCS: Performed by: INTERNAL MEDICINE

## 2024-10-22 PROCEDURE — 82784 ASSAY IGA/IGD/IGG/IGM EACH: CPT

## 2024-10-22 PROCEDURE — 92611 MOTION FLUOROSCOPY/SWALLOW: CPT

## 2024-10-22 PROCEDURE — 84100 ASSAY OF PHOSPHORUS: CPT

## 2024-10-22 PROCEDURE — 83735 ASSAY OF MAGNESIUM: CPT

## 2024-10-22 PROCEDURE — 97535 SELF CARE MNGMENT TRAINING: CPT

## 2024-10-22 PROCEDURE — 6360000002 HC RX W HCPCS: Performed by: INTERNAL MEDICINE

## 2024-10-22 PROCEDURE — 6370000000 HC RX 637 (ALT 250 FOR IP)

## 2024-10-22 PROCEDURE — 94761 N-INVAS EAR/PLS OXIMETRY MLT: CPT

## 2024-10-22 PROCEDURE — 80048 BASIC METABOLIC PNL TOTAL CA: CPT

## 2024-10-22 PROCEDURE — 2580000003 HC RX 258: Performed by: INTERNAL MEDICINE

## 2024-10-22 PROCEDURE — 97530 THERAPEUTIC ACTIVITIES: CPT

## 2024-10-22 PROCEDURE — 85025 COMPLETE CBC W/AUTO DIFF WBC: CPT

## 2024-10-22 PROCEDURE — 86140 C-REACTIVE PROTEIN: CPT

## 2024-10-22 PROCEDURE — 74230 X-RAY XM SWLNG FUNCJ C+: CPT

## 2024-10-22 PROCEDURE — 36415 COLL VENOUS BLD VENIPUNCTURE: CPT

## 2024-10-22 RX ORDER — PANTOPRAZOLE SODIUM 40 MG/1
40 TABLET, DELAYED RELEASE ORAL
Status: DISCONTINUED | OUTPATIENT
Start: 2024-10-23 | End: 2024-10-23 | Stop reason: HOSPADM

## 2024-10-22 RX ADMIN — BARIUM SULFATE 15 ML: 400 SUSPENSION ORAL at 11:28

## 2024-10-22 RX ADMIN — Medication 2 PUFF: at 08:20

## 2024-10-22 RX ADMIN — FUROSEMIDE 40 MG: 10 INJECTION, SOLUTION INTRAMUSCULAR; INTRAVENOUS at 17:33

## 2024-10-22 RX ADMIN — SODIUM CHLORIDE, PRESERVATIVE FREE 10 ML: 5 INJECTION INTRAVENOUS at 09:59

## 2024-10-22 RX ADMIN — Medication 2 PUFF: at 21:12

## 2024-10-22 RX ADMIN — MIRTAZAPINE 15 MG: 15 TABLET, FILM COATED ORAL at 20:42

## 2024-10-22 RX ADMIN — CALCITRIOL CAPSULES 0.25 MCG 0.5 MCG: 0.25 CAPSULE ORAL at 09:58

## 2024-10-22 RX ADMIN — BARIUM SULFATE 25 ML: 400 SUSPENSION ORAL at 11:28

## 2024-10-22 RX ADMIN — SODIUM CHLORIDE, PRESERVATIVE FREE 10 ML: 5 INJECTION INTRAVENOUS at 20:42

## 2024-10-22 RX ADMIN — AZITHROMYCIN DIHYDRATE 500 MG: 500 TABLET ORAL at 09:58

## 2024-10-22 RX ADMIN — BARIUM SULFATE 60 ML: 0.81 POWDER, FOR SUSPENSION ORAL at 11:29

## 2024-10-22 RX ADMIN — PIPERACILLIN SODIUM AND TAZOBACTAM SODIUM 3375 MG: 3; .375 INJECTION, POWDER, LYOPHILIZED, FOR SOLUTION INTRAVENOUS at 17:41

## 2024-10-22 RX ADMIN — CALCIUM CARBONATE 1000 MG: 500 TABLET, CHEWABLE ORAL at 17:35

## 2024-10-22 RX ADMIN — Medication 2 PUFF: at 21:14

## 2024-10-22 RX ADMIN — BARIUM SULFATE 20 ML: 400 PASTE ORAL at 11:28

## 2024-10-22 RX ADMIN — PIPERACILLIN SODIUM AND TAZOBACTAM SODIUM 3375 MG: 3; .375 INJECTION, POWDER, LYOPHILIZED, FOR SOLUTION INTRAVENOUS at 04:41

## 2024-10-22 RX ADMIN — FUROSEMIDE 40 MG: 10 INJECTION, SOLUTION INTRAMUSCULAR; INTRAVENOUS at 09:59

## 2024-10-22 RX ADMIN — CALCIUM CARBONATE 1000 MG: 500 TABLET, CHEWABLE ORAL at 09:58

## 2024-10-23 VITALS
TEMPERATURE: 97.9 F | SYSTOLIC BLOOD PRESSURE: 116 MMHG | RESPIRATION RATE: 16 BRPM | HEIGHT: 60 IN | DIASTOLIC BLOOD PRESSURE: 78 MMHG | BODY MASS INDEX: 23.68 KG/M2 | HEART RATE: 91 BPM | WEIGHT: 120.59 LBS | OXYGEN SATURATION: 100 %

## 2024-10-23 LAB
ALBUMIN SERPL-MCNC: 1.9 G/DL (ref 3.5–5)
ANION GAP SERPL CALC-SCNC: 8 MMOL/L (ref 2–12)
BACTERIA SPEC CULT: ABNORMAL
BASOPHILS # BLD: 0 K/UL (ref 0–0.1)
BASOPHILS NFR BLD: 1 % (ref 0–1)
BUN SERPL-MCNC: 34 MG/DL (ref 6–20)
BUN/CREAT SERPL: 15 (ref 12–20)
CA-I BLD-MCNC: 7.6 MG/DL (ref 8.5–10.1)
CHLORIDE SERPL-SCNC: 106 MMOL/L (ref 97–108)
CO2 SERPL-SCNC: 25 MMOL/L (ref 21–32)
CREAT SERPL-MCNC: 2.28 MG/DL (ref 0.55–1.02)
CRP SERPL-MCNC: 1.43 MG/DL (ref 0–0.3)
DIFFERENTIAL METHOD BLD: ABNORMAL
EOSINOPHIL # BLD: 0 K/UL (ref 0–0.4)
EOSINOPHIL NFR BLD: 0 % (ref 0–7)
ERYTHROCYTE [DISTWIDTH] IN BLOOD BY AUTOMATED COUNT: 21.7 % (ref 11.5–14.5)
GLUCOSE SERPL-MCNC: 84 MG/DL (ref 65–100)
GRAM STN SPEC: ABNORMAL
GRAM STN SPEC: ABNORMAL
HCT VFR BLD AUTO: 20.1 % (ref 35–47)
HCT VFR BLD AUTO: 29.7 % (ref 35–47)
HGB BLD-MCNC: 10.1 G/DL (ref 11.5–16)
HGB BLD-MCNC: 6.7 G/DL (ref 11.5–16)
IMM GRANULOCYTES # BLD AUTO: 0 K/UL
IMM GRANULOCYTES NFR BLD AUTO: 0 %
LYMPHOCYTES # BLD: 1.5 K/UL (ref 0.8–3.5)
LYMPHOCYTES NFR BLD: 54 % (ref 12–49)
Lab: ABNORMAL
MCH RBC QN AUTO: 34 PG (ref 26–34)
MCHC RBC AUTO-ENTMCNC: 33.3 G/DL (ref 30–36.5)
MCV RBC AUTO: 102 FL (ref 80–99)
MONOCYTES # BLD: 0.3 K/UL (ref 0–1)
MONOCYTES NFR BLD: 10 % (ref 5–13)
NEUTS SEG # BLD: 0.9 K/UL (ref 1.8–8)
NEUTS SEG NFR BLD: 35 % (ref 32–75)
NRBC # BLD: 0 K/UL (ref 0–0.01)
NRBC BLD-RTO: 0 PER 100 WBC
PHOSPHATE SERPL-MCNC: 2.6 MG/DL (ref 2.6–4.7)
PLATELET # BLD AUTO: 124 K/UL (ref 150–400)
PMV BLD AUTO: 11.1 FL (ref 8.9–12.9)
POTASSIUM SERPL-SCNC: 3.7 MMOL/L (ref 3.5–5.1)
PROCALCITONIN SERPL-MCNC: 0.17 NG/ML
RBC # BLD AUTO: 1.97 M/UL (ref 3.8–5.2)
RBC MORPH BLD: ABNORMAL
SODIUM SERPL-SCNC: 139 MMOL/L (ref 136–145)
WBC # BLD AUTO: 2.7 K/UL (ref 3.6–11)

## 2024-10-23 PROCEDURE — 85014 HEMATOCRIT: CPT

## 2024-10-23 PROCEDURE — 6370000000 HC RX 637 (ALT 250 FOR IP)

## 2024-10-23 PROCEDURE — 2580000003 HC RX 258: Performed by: INTERNAL MEDICINE

## 2024-10-23 PROCEDURE — 86922 COMPATIBILITY TEST ANTIGLOB: CPT

## 2024-10-23 PROCEDURE — 86920 COMPATIBILITY TEST SPIN: CPT

## 2024-10-23 PROCEDURE — 6370000000 HC RX 637 (ALT 250 FOR IP): Performed by: INTERNAL MEDICINE

## 2024-10-23 PROCEDURE — 92526 ORAL FUNCTION THERAPY: CPT

## 2024-10-23 PROCEDURE — 86921 COMPATIBILITY TEST INCUBATE: CPT

## 2024-10-23 PROCEDURE — 86880 COOMBS TEST DIRECT: CPT

## 2024-10-23 PROCEDURE — 85018 HEMOGLOBIN: CPT

## 2024-10-23 PROCEDURE — 86900 BLOOD TYPING SEROLOGIC ABO: CPT

## 2024-10-23 PROCEDURE — 85025 COMPLETE CBC W/AUTO DIFF WBC: CPT

## 2024-10-23 PROCEDURE — 6360000002 HC RX W HCPCS: Performed by: INTERNAL MEDICINE

## 2024-10-23 PROCEDURE — 97530 THERAPEUTIC ACTIVITIES: CPT

## 2024-10-23 PROCEDURE — 86901 BLOOD TYPING SEROLOGIC RH(D): CPT

## 2024-10-23 PROCEDURE — 36430 TRANSFUSION BLD/BLD COMPNT: CPT

## 2024-10-23 PROCEDURE — 80069 RENAL FUNCTION PANEL: CPT

## 2024-10-23 PROCEDURE — 86870 RBC ANTIBODY IDENTIFICATION: CPT

## 2024-10-23 PROCEDURE — 86850 RBC ANTIBODY SCREEN: CPT

## 2024-10-23 PROCEDURE — 86140 C-REACTIVE PROTEIN: CPT

## 2024-10-23 PROCEDURE — P9016 RBC LEUKOCYTES REDUCED: HCPCS

## 2024-10-23 PROCEDURE — 36415 COLL VENOUS BLD VENIPUNCTURE: CPT

## 2024-10-23 PROCEDURE — 99232 SBSQ HOSP IP/OBS MODERATE 35: CPT | Performed by: INTERNAL MEDICINE

## 2024-10-23 PROCEDURE — 84145 PROCALCITONIN (PCT): CPT

## 2024-10-23 PROCEDURE — 94640 AIRWAY INHALATION TREATMENT: CPT

## 2024-10-23 RX ORDER — FLUTICASONE PROPIONATE AND SALMETEROL 250; 50 UG/1; UG/1
1 POWDER RESPIRATORY (INHALATION) 2 TIMES DAILY
Qty: 60 EACH | Refills: 3 | Status: SHIPPED | OUTPATIENT
Start: 2024-10-23 | End: 2025-01-21

## 2024-10-23 RX ORDER — SODIUM CHLORIDE 9 MG/ML
INJECTION, SOLUTION INTRAVENOUS PRN
Status: DISCONTINUED | OUTPATIENT
Start: 2024-10-23 | End: 2024-10-23 | Stop reason: HOSPADM

## 2024-10-23 RX ORDER — LEVOFLOXACIN 500 MG/1
500 TABLET, FILM COATED ORAL DAILY
Qty: 7 TABLET | Refills: 0 | Status: SHIPPED | OUTPATIENT
Start: 2024-10-23 | End: 2024-10-30

## 2024-10-23 RX ORDER — SODIUM BICARBONATE 650 MG/1
650 TABLET ORAL 2 TIMES DAILY
Status: DISCONTINUED | OUTPATIENT
Start: 2024-10-23 | End: 2024-10-23

## 2024-10-23 RX ORDER — MIRTAZAPINE 15 MG/1
15 TABLET, FILM COATED ORAL NIGHTLY
Qty: 30 TABLET | Refills: 3 | Status: SHIPPED | OUTPATIENT
Start: 2024-10-23

## 2024-10-23 RX ORDER — CALCITRIOL 0.5 UG/1
0.5 CAPSULE, LIQUID FILLED ORAL DAILY
Qty: 30 CAPSULE | Refills: 3 | Status: SHIPPED | OUTPATIENT
Start: 2024-10-23

## 2024-10-23 RX ORDER — CALCIUM CARBONATE 500 MG/1
1000 TABLET, CHEWABLE ORAL 2 TIMES DAILY
Qty: 120 TABLET | Refills: 0 | Status: SHIPPED | OUTPATIENT
Start: 2024-10-23 | End: 2024-11-22

## 2024-10-23 RX ADMIN — CALCIUM CARBONATE 1000 MG: 500 TABLET, CHEWABLE ORAL at 09:49

## 2024-10-23 RX ADMIN — PANTOPRAZOLE SODIUM 40 MG: 40 TABLET, DELAYED RELEASE ORAL at 06:36

## 2024-10-23 RX ADMIN — FUROSEMIDE 40 MG: 10 INJECTION, SOLUTION INTRAMUSCULAR; INTRAVENOUS at 09:50

## 2024-10-23 RX ADMIN — Medication 2 PUFF: at 06:06

## 2024-10-23 RX ADMIN — POLYETHYLENE GLYCOL 3350 17 G: 17 POWDER, FOR SOLUTION ORAL at 09:50

## 2024-10-23 RX ADMIN — CALCITRIOL CAPSULES 0.25 MCG 0.5 MCG: 0.25 CAPSULE ORAL at 09:49

## 2024-10-23 RX ADMIN — AZITHROMYCIN DIHYDRATE 500 MG: 500 TABLET ORAL at 09:50

## 2024-10-23 RX ADMIN — PIPERACILLIN SODIUM AND TAZOBACTAM SODIUM 3375 MG: 3; .375 INJECTION, POWDER, LYOPHILIZED, FOR SOLUTION INTRAVENOUS at 04:15

## 2024-10-23 RX ADMIN — Medication 2 PUFF: at 06:05

## 2024-10-23 ASSESSMENT — PAIN SCALES - GENERAL
PAINLEVEL_OUTOF10: 0

## 2024-10-23 NOTE — CARE COORDINATION
Patient is clear to d/c from [facility] to [facility], [phone], nurse report can be called to [phone].    CM met with patient to notify, she is agreeable, called spouse in room with patient, he will transport patient around 2/3, nurse notified.    Transition of Care Plan:    RUR: 22%  Prior Level of Functioning: assistance  Disposition: SNF  If SNF or IPR: Date FOC offered:   Date FOC received:   Accepting facility: [facility]  Date authorization started with reference number:   Date authorization received and expires:   Follow up appointments:   DME needed:   Transportation at discharge: spouse  IM/IMM Medicare/ letter given: yes  Is patient a [place] and connected with [facility]?    If yes, was [facility] transfer form completed and [facility] notified?   Caregiver Contact:   Discharge Caregiver contacted prior to discharge? Spouse, yes  Care Conference needed?   Barriers to discharge: blood transfusion

## 2024-10-23 NOTE — PROGRESS NOTES
Hematology and Oncology Inpatient progress notes  Patient: Brenda Olivas MRN: [id number]  SSN: [id number]    YOB: 1945  Age: 79 y.o.  Sex: female    Chief Complaint:    Reason for consult: Pancytopenia and multiple myeloma     Subjective:      Brenda Olivas is a 79 y.o. female who is being seen for pancytopenia.    Ms. Olivas is a 79-year-old female who reports having multiple myeloma since 2018 and has been on treatments.  She reports she is currently being followed at [facility] and currently on treatment with Pomalyst for more than a year.  She reports she recently had a bone marrow 2 weeks ago and is waiting on the results.  She came to the hospital now as she had a syncope at home.  She felt very weak short of breath and fainted at home.    She has multiple medical problems including CKD, Coronary artery disease status post stents A-fib post ablation, GERD and questionable history of heart failure history of pelvic fracture status post hip replacement on the right lower extremity wound.  On labs in the emergency room she is found to have anemia with a hemoglobin of 6.7 g/dL elevated BNP and fluid overload.  She received packed red blood cell transfusions.    She is feeling better today.  Underwent EGD yesterday showed esophageal motility problems and concern for aspiration.  Patient reports having low blood counts in the past as well.    Interval history: Feeling better today no new complaints.  No fevers or chills tired.  Hemoglobin remained stable at 8.2 g/dL.    Current Facility-Administered Medications   Medication Dose Route Frequency Provider Last Rate Last Admin    furosemide (LASIX) injection 40 mg  40 mg IntraVENous BID Naveed Santos MD        albuterol sulfate HFA (PROVENTIL;VENTOLIN;PROAIR) 108 (90 Base) MCG/ACT inhaler 2 puff  2 puff Inhalation BID RT Naveed Santos MD        amoxicillin-clavulanate (AUGMENTIN) 500-125 MG per tablet 1 tablet  1 tablet Oral 2 times 
    CARDIOLOGY PROGRESS NOTE      Patient Name: Brenda Olivas  Age: 79 y.o.  Gender:female  :1945  MRN: [id number]    Patient seen and examined. This is a patient with a history of Multiple myeloma on chemotherapy, CKD stage III, CAD status post PCI, A-fib status post ablation, GERD, heart failure with preserved EF, history of pelvic fracture status post right hip replacement, right lower extremity wound who presented with shortness of breath and weakness now being followed for CHF. Sitting upright in chair. Denies chest pain. Breathing improving. No other complaints reported.    10/19: Patient seen and examined, continuing to diurese well. No acute complaints. Continue current plan of care    10/20: patient seen and examined. Continue to diurese as renal function allows. No acute changes overnight. She is without complaints today. In no acute distress on exam.     Telemetry reviewed, there were no events noted in the past 24 hours.    Pertinent review of systems items noted above, all other systems are negative. Current medications reviewed.    Physical Examination    No Known Allergies  Vitals:    10/20/24 1443   BP: (!) 127/48   Pulse: 77   Resp: 18   Temp: 97.9 °F (36.6 °C)   SpO2: 99%     Vital signs are stable  No apparent distress.  Heart has a RRR.  No murmur  Lungs are diminished  Abdomen is soft, nontender, normal bowel sounds.  Extremities have + edema  Skin is dry and warm.  Normal affect    Labs reviewed:  Recent Results (from the past 12 hour(s))   CBC with Auto Differential    Collection Time: 10/20/24  3:49 AM   Result Value Ref Range    WBC 2.1 (L) 3.6 - 11.0 K/uL    RBC 2.44 (L) 3.80 - 5.20 M/uL    Hemoglobin 8.2 (L) 11.5 - 16.0 g/dL    Hematocrit 25.1 (L) 35.0 - 47.0 %    .9 (H) 80.0 - 99.0 FL    MCH 33.6 26.0 - 34.0 PG    MCHC 32.7 30.0 - 36.5 g/dL    RDW 23.4 (H) 11.5 - 14.5 %    Platelets 122 (L) 150 - 400 K/uL    MPV 11.5 8.9 - 12.9 FL    Nucleated RBCs 1.0 (H) 0.0  WBC 
    CARDIOLOGY PROGRESS NOTE      Patient Name: Brenda Olivas  Age: 79 y.o.  Gender:female  :1945  MRN: [id number]    Patient seen and examined. This is a patient with a history of Multiple myeloma on chemotherapy, CKD stage III, CAD status post PCI, A-fib status post ablation, GERD, heart failure with preserved EF, history of pelvic fracture status post right hip replacement, right lower extremity wound who presented with shortness of breath and weakness now being followed for CHF. Resting in bed. Denies chest pain. Breathing improving. Continues with LE edema and pain. No other complaints reported.    Telemetry reviewed, there were no events noted in the past 24 hours.    Pertinent review of systems items noted above, all other systems are negative. Current medications reviewed.    Physical Examination    No Known Allergies  Vitals:    10/22/24 0807   BP: 130/75   Pulse: 91   Resp: 18   Temp: 98.2 °F (36.8 °C)   SpO2: 96%     Vital signs are stable  No apparent distress.  Heart has a RRR.  No murmur  Lungs are diminished  Abdomen is soft, nontender, normal bowel sounds.  Extremities have + edema  Skin is dry and warm.  Normal affect    Labs reviewed:  Recent Results (from the past 12 hour(s))   CBC with Auto Differential    Collection Time: 10/22/24  7:50 AM   Result Value Ref Range    WBC 2.3 (L) 3.6 - 11.0 K/uL    RBC 2.32 (L) 3.80 - 5.20 M/uL    Hemoglobin 7.9 (L) 11.5 - 16.0 g/dL    Hematocrit 24.0 (L) 35.0 - 47.0 %    .4 (H) 80.0 - 99.0 FL    MCH 34.1 (H) 26.0 - 34.0 PG    MCHC 32.9 30.0 - 36.5 g/dL    RDW 22.5 (H) 11.5 - 14.5 %    Platelets 139 (L) 150 - 400 K/uL    MPV 11.3 8.9 - 12.9 FL    Nucleated RBCs 0.0 0.0  WBC    nRBC 0.00 0.00 - 0.01 K/uL    Neutrophils % 38 32 - 75 %    Band Neutrophils 1 0 - 6 %    Lymphocytes % 46 12 - 49 %    Monocytes % 6 5 - 13 %    Eosinophils % 7 0 - 7 %    Basophils % 0 0 - 1 %    Metamyelocytes 1 (H) 0 %    Myelocytes 1 (H) 0 %    Immature 
    CARDIOLOGY PROGRESS NOTE      Patient Name: Brenda Olivas  Age: 79 y.o.  Gender:female  :1945  MRN: [id number]    Patient seen and examined. This is a patient with a history of Multiple myeloma on chemotherapy, CKD stage III, CAD status post PCI, A-fib status post ablation, GERD, heart failure with preserved EF, history of pelvic fracture status post right hip replacement, right lower extremity wound who presented with shortness of breath and weakness now being followed for CHF. Sitting upright in bed. Denies chest pain. Breathing improving. Continues with LE edema. No other complaints reported.    Telemetry reviewed, there were no events noted in the past 24 hours.    Pertinent review of systems items noted above, all other systems are negative. Current medications reviewed.    Physical Examination    No Known Allergies  Vitals:    10/21/24 1450   BP: (!) 118/99   Pulse: 77   Resp: 18   Temp: 97.7 °F (36.5 °C)   SpO2: 100%     Vital signs are stable  No apparent distress.  Heart has a RRR.  No murmur  Lungs are diminished  Abdomen is soft, nontender, normal bowel sounds.  Extremities have + edema  Skin is dry and warm.  Normal affect    Labs reviewed:  Recent Results (from the past 12 hour(s))   Vascular ankle brachial index (TERENCE)    Collection Time: 10/21/24  1:42 PM   Result Value Ref Range    Body Surface Area 1.58 m2    Right arm  mmHg    Right dorsalis pedis  mmHg    Right TERENCE 1.58     Left arm  mmHg        Case discussed with Dr. Freitas and our impression and recommendations are as follows:  HFpEF  Previously low EF, normal diastolic function and EF per most recent echocardiogram  Continue IV diuresis, Lasix 40mg IV BID  Need strict I&Os, daily weight  Repeat echo this admission with EF 55-60%  Keep electrolytes WNL, K+ 4-5, Mg >2.   GDMT once able   Near syncope  Troponin negative x 2   Continue telemetry   Will plan for OP holter/MCOT   Leg swelling/bruising, venous duplex 
    CARDIOLOGY PROGRESS NOTE      Patient Name: Brenda Olivas  Age: 79 y.o.  Gender:female  :1945  MRN: [id number]    Patient seen and examined. This is a patient with a history of Multiple myeloma on chemotherapy, CKD stage III, CAD status post PCI, A-fib status post ablation, GERD, heart failure with preserved EF, history of pelvic fracture status post right hip replacement, right lower extremity wound who presented with shortness of breath and weakness now being followed for CHF. Sitting upright in chair. Denies chest pain. Breathing improving. No other complaints reported.    10/19: Patient seen and examined, continuing to diurese well. No acute complaints. Continue current plan of care    Telemetry reviewed, there were no events noted in the past 24 hours.    Pertinent review of systems items noted above, all other systems are negative. Current medications reviewed.    Physical Examination    No Known Allergies  Vitals:    10/19/24 1120   BP: 121/83   Pulse: 83   Resp: 17   Temp: 98.2 °F (36.8 °C)   SpO2: 100%     Vital signs are stable  No apparent distress.  Heart has a RRR.  No murmur  Lungs are diminished  Abdomen is soft, nontender, normal bowel sounds.  Extremities have + edema  Skin is dry and warm.  Normal affect    Labs reviewed:  Recent Results (from the past 12 hour(s))   Comprehensive Metabolic Panel    Collection Time: 10/19/24  3:26 AM   Result Value Ref Range    Sodium 143 136 - 145 mmol/L    Potassium 3.2 (L) 3.5 - 5.1 mmol/L    Chloride 114 (H) 97 - 108 mmol/L    CO2 21 21 - 32 mmol/L    Anion Gap 8 2 - 12 mmol/L    Glucose 72 65 - 100 mg/dL    BUN 42 (H) 6 - 20 mg/dL    Creatinine 2.45 (H) 0.55 - 1.02 mg/dL    BUN/Creatinine Ratio 17 12 - 20      Est, Glom Filt Rate 20 (L) >60 ml/min/1.73m2    Calcium 6.4 (LL) 8.5 - 10.1 mg/dL    Total Bilirubin 0.5 0.2 - 1.0 mg/dL    AST 7 (L) 15 - 37 U/L    ALT <6 (L) 12 - 78 U/L    Alk Phosphatase 131 (H) 45 - 117 U/L    Total Protein 5.2 (L) 6.4 - 
    CARDIOLOGY PROGRESS NOTE      Patient Name: Brenda Olivas  Age: 79 y.o.  Gender:female  :1945  MRN: [id number]    Patient seen and examined. This is a patient with a history of Multiple myeloma on chemotherapy, CKD stage III, CAD status post PCI, A-fib status post ablation, GERD, heart failure with preserved EF, history of pelvic fracture status post right hip replacement, right lower extremity wound who presented with shortness of breath and weakness now being followed for CHF. Sitting upright in chair. Denies chest pain. Breathing improving. No other complaints reported.    Telemetry reviewed, there were no events noted in the past 24 hours.    Pertinent review of systems items noted above, all other systems are negative. Current medications reviewed.    Physical Examination    No Known Allergies  Vitals:    10/18/24 1129   BP: 111/80   Pulse: 73   Resp: 18   Temp: 97.3 °F (36.3 °C)   SpO2: 91%     Vital signs are stable  No apparent distress.  Heart has a RRR.  No murmur  Lungs are diminished  Abdomen is soft, nontender, normal bowel sounds.  Extremities have + edema  Skin is dry and warm.  Normal affect    Labs reviewed:  Recent Results (from the past 12 hour(s))   Comprehensive Metabolic Panel    Collection Time: 10/18/24  4:42 AM   Result Value Ref Range    Sodium 142 136 - 145 mmol/L    Potassium 3.5 3.5 - 5.1 mmol/L    Chloride 117 (H) 97 - 108 mmol/L    CO2 17 (L) 21 - 32 mmol/L    Anion Gap 8 2 - 12 mmol/L    Glucose 68 65 - 100 mg/dL    BUN 41 (H) 6 - 20 mg/dL    Creatinine 2.44 (H) 0.55 - 1.02 mg/dL    BUN/Creatinine Ratio 17 12 - 20      Est, Glom Filt Rate 20 (L) >60 ml/min/1.73m2    Calcium 6.4 (LL) 8.5 - 10.1 mg/dL    Total Bilirubin 0.6 0.2 - 1.0 mg/dL    AST 10 (L) 15 - 37 U/L    ALT 15 12 - 78 U/L    Alk Phosphatase 123 (H) 45 - 117 U/L    Total Protein 4.8 (L) 6.4 - 8.2 g/dL    Albumin 1.9 (L) 3.5 - 5.0 g/dL    Globulin 2.9 2.0 - 4.0 g/dL    Albumin/Globulin Ratio 0.7 (L) 1.1 - 2.2   
    Hematology/Oncology   Progress Note    Patient: Brenda Olivas MRN: [id number]     YOB: 1945  Age: 79 y.o.  Sex: female      Admit Date: 10/16/2024    LOS: 6 days     Reason for consult: Pancytopenia and multiple myeloma    Subjective:     Patient sitting up in the chair, no acute distress.  Patient denies she is feeling better although still fatigued. Patient denies chest pain, shortness of breath, any evidence of bleeding.    Objective:     Vitals:    10/22/24 0440 10/22/24 0700 10/22/24 0807 10/22/24 1243   BP:   130/75 106/73   Pulse:  86 91 62   Resp:   18 18   Temp:   98.2 °F (36.8 °C) 97.3 °F (36.3 °C)   TempSrc:   Oral Oral   SpO2:   96% 99%   Weight: 52.4 kg (115 lb 8.3 oz)      Height:          Physical Exam:  Constitutional: -American female, elderly not in any acute distress or pain.  Eyes: Sclerae anicteric. Conjunctivae has pallor.  ENMT: Oral mucosa is moist, no thrush, mucositis, or petechiae.  Respiratory: Lungs are clear bilaterally.  Cardiovascular: Normal sinus rhythm; no gallop or murmur; peripheral pulses are palpable.  Abdomen: Soft, nontender, no hepatosplenomegaly. No guarding or rigidity. Bowel sounds present.  Extremities: Edema is present to bilateral lower extremities; right calf and dressing due to wound   skin: No petechiae; no skin rash.  Neurologic: Alert/oriented     Lab/Data Review:  Recent Labs     10/20/24  0349 10/21/24  0305 10/22/24  0750   WBC 2.1* 1.9* 2.3*   HGB 8.2* 7.7* 7.9*   HCT 25.1* 23.3* 24.0*   * 129* 139*     Recent Labs     10/20/24  0349 10/21/24  0305 10/21/24  0349 10/22/24  0750    143  --  141   K 4.4 4.0  --  4.3   * 112*  --  110*   CO2 20* 24  --  27   BUN 40* 36*  --  34*   MG  --   --  1.3* 2.9*   PHOS  --   --   --  2.7     No results for input(s): \"PH\", \"PCO2\", \"PO2\", \"HCO3\", \"FIO2\" in the last 72 hours.  Recent Results (from the past 24 hour(s))   Vascular ankle brachial index (TERENCE)    Collection Time: 
    Hospitalist Progress Note               Daily Progress Note: 10/17/2024      Hospital Day: 2     Chief complaint:   Chief Complaint   Patient presents with    Shortness of Breath           Fatigue    Near Syncope        Brief HPI/ Hospital course to date:  Brenda Olivas is a 79 y.o.  female with PMHx significant for Multiple myeloma on chemotherapy, CKD stage III, CAD status post PCI, A-fib status post ablation, GERD, heart failure with preserved EF, history of pelvic fracture status post right hip replacement,  right lower extremity wound presented to the ED due to shortness of breath, feeling weak, presyncope. 3 weeks ago, she slipped off the recliner and hit her right lower extremity. Since then she has a small wound however unfortunately she did not seek any medical attention sooner. She also stopped taking her CHF medication, as it was making her urinate a lot and she did not go to the bathroom multiple times which she also stopped taking for a week. CBC shows leukopenia of 2.6, anemia of 6.7, MCV of 108, normal platelet. Chest X-ray no acute finding., X-ray of the right lower extremity shows soft tissue swelling. Pro-BNP elevated. Transfused 1 unit of PRBC, IV lasix started. ECHO showing LVEF 55-60%, normal wall motion.     --------  Patient is seen today for follow-up. Reports work of breathing better today than yesterday. On 2L NC, on R.A. at home.  Still has b/l LE edema, has a right lower leg wound, that she reports has been having purulent discharge. Noted WBC 2.6, leucopenia, otherwise not meeting sepsis criteria.  Wound care consult, wound culture, and IV cefazolin empirically. Hemoglobin 6.7 last night, s/p 1u pRBC transfusion. On further history, patient does reports intermittently having black tarry stool. Denies abdominal pain, nausea or vomiting or bleeding from other sites. Will consult GI.        All ROS negative otherwise mentioned above.      /80   Pulse 83   Temp 97.5 °F (36.4 °C) 
    Hospitalist Progress Note               Daily Progress Note: 10/18/2024      Hospital Day: 3     Chief complaint:   Chief Complaint   Patient presents with    Shortness of Breath           Fatigue    Near Syncope        Brief HPI/ Hospital course to date:  Brenda Olivas is a 79 y.o.  female with PMHx significant for Multiple myeloma on chemotherapy, CKD stage III, CAD status post PCI, A-fib status post ablation, GERD, heart failure with preserved EF, history of pelvic fracture status post right hip replacement,  right lower extremity wound presented to the ED due to shortness of breath, feeling weak, presyncope. 3 weeks ago, she slipped off the recliner and hit her right lower extremity. Since then she has a small wound however unfortunately she did not seek any medical attention sooner. She also stopped taking her CHF medication, as it was making her urinate a lot and she did not go to the bathroom multiple times which she also stopped taking for a week. CBC shows leukopenia of 2.6, anemia of 6.7, MCV of 108, normal platelet. Chest X-ray no acute finding., X-ray of the right lower extremity shows soft tissue swelling. Pro-BNP elevated. Transfused 1 unit of PRBC, IV lasix started. ECHO showing LVEF 55-60%, normal wall motion. On further history, patient does reports intermittently having black tarry stool. GI consulted and planned for EGD 10/18. Patient also reports having a right lower leg wound, that she reports has been having purulent discharge, starte don IV cefazolin empirically. ECHO LVEF 55-60%, normal wall motion and diastolic function. Cardiology also recomended plan for OP holter/MCOT. Hospital course complicated by ASHANTI on CKD. Nephrology consulted. Renal ultrasound showed echogenic kidneys compatible with medical renal disease, no hydronephrosis    - wound culture     --------  Patient is seen today for follow-up.work of breathing good. On 1.5L NC, on R.A. at home.  Still has b/l LE edema, has a 
    Hospitalist Progress Note               Daily Progress Note: 10/19/2024      Hospital Day: 4     Chief complaint:   Chief Complaint   Patient presents with    Shortness of Breath           Fatigue    Near Syncope        Brief HPI/ Hospital course to date:  Brenda Olivas is a 79 y.o.  female with PMHx significant for Multiple myeloma on chemotherapy, CKD stage III, CAD status post PCI, A-fib status post ablation, GERD, heart failure with preserved EF, history of pelvic fracture status post right hip replacement,  right lower extremity wound presented to the ED due to shortness of breath, feeling weak, presyncope. 3 weeks ago, she slipped off the recliner and hit her right lower extremity. Since then she has a small wound however unfortunately she did not seek any medical attention sooner. She also stopped taking her CHF medication, as it was making her urinate a lot and she did not go to the bathroom multiple times which she also stopped taking for a week. Hypoxic requiring 3L NC, not septic. CBC shows leukopenia of 2.6, anemia of 6.7, MCV of 108, normal platelet. Chest X-ray no acute finding., X-ray of the right lower extremity shows soft tissue swelling. Pro-BNP elevated. Transfused 1 unit of PRBC, IV lasix started. ECHO showing LVEF 55-60%, normal wall motion. On further history, patient does reports intermittently having black tarry stool. GI consulted and planned for EGD 10/18, showed no bleeding but has abnormal esophageal mobility. Patient also reports having a right lower leg wound, that she reports has been having purulent discharge, starte don IV cefazolin empirically. Later found out chst CT also has findings of aspiration pneumonia, changes antibiotics to PO augmentin to cover anaeobics. Patient is not septic. ECHO LVEF 55-60%, normal wall motion and diastolic function. Cardiology also recomended plan for OP holter/MCOT. Hospital course complicated by ASHANTI on CKD. Nephrology consulted. Renal ultrasound 
    Hospitalist Progress Note               Daily Progress Note: 10/20/2024      Hospital Day: 5     Chief complaint:   Chief Complaint   Patient presents with    Shortness of Breath           Fatigue    Near Syncope        Brief HPI/ Hospital course to date:  Brenda Olivas is a 79 y.o.  female with PMHx significant for Multiple myeloma on chemotherapy, CKD stage III, CAD status post PCI, A-fib status post ablation, GERD, heart failure with preserved EF, history of pelvic fracture status post right hip replacement,  right lower extremity wound presented to the ED due to shortness of breath, feeling weak, presyncope. 3 weeks ago, she slipped off the recliner and hit her right lower extremity. Since then she has a small wound however unfortunately she did not seek any medical attention sooner. She also stopped taking her CHF medication, as it was making her urinate a lot and she did not go to the bathroom multiple times which she also stopped taking for a week. Hypoxic requiring 3L NC, not septic. CBC shows leukopenia of 2.6, anemia of 6.7, MCV of 108, normal platelet. Chest X-ray no acute finding., X-ray of the right lower extremity shows soft tissue swelling. Pro-BNP elevated. Transfused 1 unit of PRBC, IV lasix started. ECHO showing LVEF 55-60%, normal wall motion. On further history, patient does reports intermittently having black tarry stool. GI consulted and planned for EGD 10/18, showed no bleeding but has abnormal esophageal mobility. Patient also reports having a right lower leg wound, that she reports has been having purulent discharge, starte don IV cefazolin empirically. Later found out chst CT also has findings of aspiration pneumonia, changes antibiotics to PO augmentin to cover anaeobics. Patient is not septic. ECHO LVEF 55-60%, normal wall motion and diastolic function. Cardiology also recomended plan for OP holter/MCOT as outpatient. Hospital course complicated by ASHANTI on CKD. Nephrology consulted. 
    Hospitalist Progress Note               Daily Progress Note: 10/21/2024      Hospital Day: 6     Chief complaint:   Chief Complaint   Patient presents with    Shortness of Breath           Fatigue    Near Syncope        Brief HPI/ Hospital course to date:  Brenda Olivas is a 79 y.o.  female with PMHx significant for Multiple myeloma on chemotherapy, CKD stage III, CAD status post PCI, A-fib status post ablation, GERD, heart failure with preserved EF, history of pelvic fracture status post right hip replacement,  right lower extremity wound presented to the ED due to shortness of breath, feeling weak, presyncope. 3 weeks ago, she slipped off the recliner and hit her right lower extremity. Since then she has a small wound however unfortunately she did not seek any medical attention sooner. She also stopped taking her CHF medication, as it was making her urinate a lot and she did not go to the bathroom multiple times which she also stopped taking for a week. Hypoxic requiring 3L NC, not septic. CBC shows leukopenia of 2.6, anemia of 6.7, MCV of 108, normal platelet. Chest X-ray no acute finding., X-ray of the right lower extremity shows soft tissue swelling. Pro-BNP elevated. Transfused 1 unit of PRBC, IV lasix started. ECHO showing LVEF 55-60%, normal wall motion. On further history, patient does reports intermittently having black tarry stool. GI consulted and planned for EGD 10/18, showed no bleeding but has abnormal esophageal mobility. Patient also reports having a right lower leg wound, that she reports has been having purulent discharge, starte don IV cefazolin empirically. Later found out chst CT also has findings of aspiration pneumonia, changes antibiotics to PO augmentin to cover anaeobics. Patient is not septic. ECHO LVEF 55-60%, normal wall motion and diastolic function. Cardiology also recomended plan for OP holter/MCOT as outpatient. Hospital course complicated by ASHANTI on CKD. Nephrology consulted. 
    Hospitalist Progress Note               Daily Progress Note: 10/22/2024      Hospital Day: 7     Chief complaint:   Chief Complaint   Patient presents with    Shortness of Breath           Fatigue    Near Syncope        Subjective:   Hospital course to date:    79 year old female with multiple myeloma, CKD, hypertension, A-fib, CAD, heart failure, and history of gastric bypass surgery who initially presented to ED with syncope and SOB. On exam she had bilateral lower extremity edema with a necrotic wound on the right lateral leg with surrounding erythema and tenderness. She was leukopenic, attributed to chemotherapy. Procal and CRP were elevated. CXR was normal. XR right leg showed diffuse soft tissue swelling but no bony abnormality. CT Head showed only left sphenoid sinus disease. CT CHEST showed nodular groundglass opacities, patulous esophagus and trace ascites.  Creatinine was 2.5 on admission, baseline around 2.  Hemoglobin was 6.7.  Patient did report recent melena.    Patient was admitted, transfused with 1 unit of blood and started on IV furosemide.    Echo showed EF of 55 to 60%.    She was seen in consultation by nephrology, cardiology, oncology, GI and ID    Ultrasound of the kidneys showed no hydronephrosis.    She underwent EGD which showed no bleeding but did show abnormal esophageal motility.    Patient was started on antibiotics for suspected aspiration pneumonia as well as her right leg wound.    Wound culture from the right leg wound showed Pseudomonas putida and ID recommended continuation of Zosyn    Her creatinine improved.  Leukopenia also improved    She was weaned off oxygen      --------  Patient is seen today for follow-up.  Doing well overall.  Breathing company on room air.  No new complaints today.  Awaiting KARLY of Pseudomonas from her right leg wound        Medications reviewed  Current Facility-Administered Medications   Medication Dose Route Frequency    piperacillin-tazobactam 
..4 Eyes Skin Assessment     NAME:  Brenda Olivas  YOB: 1945  MEDICAL RECORD NUMBER:  [id number]    The patient is being assessed for  Shift Handoff    I agree that at least one RN has performed a thorough Head to Toe Skin Assessment on the patient. ALL assessment sites listed below have been assessed.      Areas assessed by both nurses:    Head, Face, Ears, Shoulders, Back, Chest, Arms, Elbows, Hands, Sacrum. Buttock, Coccyx, Ischium, Legs. Feet and Heels, and Under Medical Devices         Does the Patient have a Wound? Yes wound(s) were present on assessment. LDA wound assessment was Initiated and completed by RN       Neymar Prevention initiated by RN: No  Wound Care Orders initiated by RN: Yes    Pressure Injury (Stage 3,4, Unstageable, DTI, NWPT, and Complex wounds) if present, place Wound referral order by RN under : No    New Ostomies, if present place, Ostomy referral order under : No     Nurse 1 eSignature: Electronically signed by Marc Barrios RN on 10/23/24 at 10:58 AM EDT    **SHARE this note so that the co-signing nurse can place an eSignature**    Nurse 2 eSignature: Electronically signed by Sindy Hudson RN on 10/23/24 at 11:10 AM EDT   
4 Eyes Skin Assessment     NAME:  Brenda Olivas  YOB: 1945  MEDICAL RECORD NUMBER:  [id number]    The patient is being assessed for  Admission    I agree that at least one RN has performed a thorough Head to Toe Skin Assessment on the patient. ALL assessment sites listed below have been assessed.      Areas assessed by both nurses:    Head, Face, Ears, Shoulders, Back, Chest, Arms, Elbows, Hands, Sacrum. Buttock, Coccyx, Ischium, and Legs. Feet and Heels        Does the Patient have a Wound? Yes wound(s) were present on assessment. LDA wound assessment was Initiated and completed by RN     Right lower leg wound. Bruising to RUE/RLE and Right heel  Neymar Prevention initiated by RN: Yes  Wound Care Orders initiated by RN: Yes    Pressure Injury (Stage 3,4, Unstageable, DTI, NWPT, and Complex wounds) if present, place Wound referral order by RN under : No    New Ostomies, if present place, Ostomy referral order under : No     Nurse 1 eSignature: Electronically signed by Yennifer andrade RN on 10/17/24 at 8:59 AM EDT    **SHARE this note so that the co-signing nurse can place an eSignature**    Nurse 2 eSignature: Electronically signed by Sindy Hudson RN on 10/17/24 at 8:03 PM EDT   
Echo completed. Report to follow.    
Nurse Celia taken ill. Assuming care for this patient.   
OT eval order received and acknowledged. OT eval attempted at 11:19 however Hemoglobin is 6.7. Per nursing pt is pending another blood transfusion this afternoon. Will continue to follow patient and attempt OT eval at a later time. Thank you.   
Patient's dose of ANCEF due 0115 not yet given due to unavailability of IV Access.     Patient's skin around current IV access is reddened. Patient has no complaints but when flushed, patient complains of pain. IV site received this way.    Patient is a hardstick. RN tried 2x but no success. Called ICU if they could insert via ultrasound guided. ICU nurse told RN that she will look and ask if someone is available to insert and give RN a call back. As of the writing and posting of this progress note, nobody from ICU has come up yet and no callback or any update received.    Patient stable and not in distress.    Patient monitored.    Endorsed.    Called supervisor to inform about request for ICU assistance for ultrasound guided PIV insertion. Asked supervisor to help follow-up with ICU regarding request.    Patient stable and not in distress.    Patient monitored.    Endorsed      @ around 04:00 charge nurse called ER to see if they can help with the ultrasound guided PIV insertion. @ around 04:15 ER NIKO George came up and inserted PIC via ultrasound guidance 1 attempt.    Patient tolerated procedure.    Patient stable and not in distress.    Patient monitored.    Endorsed.    
Pt rec'd d/c orders/paperwork, verbalized understanding. Will go to [facility] by transport with family. Removed IV and tele  
RT Nebulizer Bronchodilator Protocol Note    There is a bronchodilator order in the chart from a provider indicating to follow the RT Bronchodilator Protocol and there is an “Initiate RT Bronchodilator Protocol” order as well (see protocol at bottom of note).    CXR Findings:  No results found.    The findings from the last RT Protocol Assessment were as follows:  Smoking: None or smoker <15 pack years  Respiratory Pattern: Regular pattern and RR 12-20 bpm  Breath Sounds: Slightly diminished and/or crackles  Cough: Strong, spontaneous, non-productive  Indication for Bronchodilator Therapy: On home bronchodilators  Bronchodilator Assessment Score: 2    Aerosolized bronchodilator medication orders have been revised according to the RT Nebulizer Bronchodilator Protocol below.    Respiratory Therapist to perform RT Therapy Protocol Assessment initially then follow the protocol.  Repeat RT Therapy Protocol Assessment PRN for score 0-3 or on second treatment, BID, and PRN for scores above 3.    No Indications - adjust the frequency to every 6 hours PRN wheezing or bronchospasm, if no treatments needed after 48 hours then discontinue using Per Protocol order mode.     If indication present, adjust the RT bronchodilator orders based on the Bronchodilator Assessment Score as indicated below.  If a patient is on this medication at home then do not decrease Frequency below that used at home.    0-3 - enter or revise RT bronchodilator order(s) to equivalent RT Bronchodilator order with Frequency of every 4 hours PRN for wheezing or increased work of breathing using Per Protocol order mode.       4-6 - enter or revise RT Bronchodilator order(s) to two equivalent RT bronchodilator orders with one order with BID Frequency and one order with Frequency of every 4 hours PRN wheezing or increased work of breathing using Per Protocol order mode.         7-10 - enter or revise RT Bronchodilator order(s) to two equivalent RT 
Received Order for Telemetry     Brenda Olivas   1945   [id number]   Shortness of breath [R06.02]  CHF (congestive heart failure), NYHA class I, acute on chronic, combined (HCC) [I50.43]  Acute on chronic congestive heart failure, unspecified heart failure type (HCC) [I50.9]   Smita Ortega MD     Tele Box # [id number] placed on patient at  0132 pm  ER Room # 17  Admitting to Room 478  Verified with Primary Nurse MAME at  0132 pm    
Renal Progress Note    Patient: Brenda Olivas MRN: [id number]  SSN: [id number]    YOB: 1945  Age: 79 y.o.  Sex: female      Admit Date: 10/16/2024    LOS: 5 days     Subjective:   Patient seen at bedside. Alert and awake, no acute distress.   Denies shortness of breath, on RA.  Swelling in BLE + but improving with good output, 1.5 L UOP overnight.    Repeat labs show creatinine of 2.3-->2.1, CO2 20-->24, calcium 7.3.     Current Facility-Administered Medications   Medication Dose Route Frequency    furosemide (LASIX) injection 40 mg  40 mg IntraVENous BID    albuterol sulfate HFA (PROVENTIL;VENTOLIN;PROAIR) 108 (90 Base) MCG/ACT inhaler 2 puff  2 puff Inhalation BID RT    amoxicillin-clavulanate (AUGMENTIN) 500-125 MG per tablet 1 tablet  1 tablet Oral 2 times per day    azithromycin (ZITHROMAX) tablet 500 mg  500 mg Oral Daily    sodium bicarbonate tablet 1,300 mg  1,300 mg Oral BID    calcitRIOL (ROCALTROL) capsule 0.5 mcg  0.5 mcg Oral Daily    calcium carbonate (TUMS) chewable tablet 1,000 mg  1,000 mg Oral BID    ipratropium 0.5 mg-albuterol 2.5 mg (DUONEB) nebulizer solution 1 Dose  1 Dose Inhalation Q4H PRN    polyethylene glycol (GLYCOLAX) packet 17 g  17 g Oral BID    [Held by provider] pomalidomide (POMALYST) chemo capsule 2 mg (Patient Supplied)  2 mg Oral Nightly    epoetin juvenal-epbx (RETACRIT) injection 10,000 Units  10,000 Units SubCUTAneous Weekly    0.9 % sodium chloride infusion   IntraVENous PRN    sodium chloride flush 0.9 % injection 5-40 mL  5-40 mL IntraVENous 2 times per day    sodium chloride flush 0.9 % injection 5-40 mL  5-40 mL IntraVENous PRN    0.9 % sodium chloride infusion   IntraVENous PRN    ondansetron (ZOFRAN-ODT) disintegrating tablet 4 mg  4 mg Oral Q8H PRN    Or    ondansetron (ZOFRAN) injection 4 mg  4 mg IntraVENous Q6H PRN    polyethylene glycol (GLYCOLAX) packet 17 g  17 g Oral Daily PRN    acetaminophen (TYLENOL) tablet 650 mg  650 mg Oral Q6H PRN    Or    
Renal Progress Note    Patient: Brenda Olivas MRN: [id number]  SSN: [id number]    YOB: 1945  Age: 79 y.o.  Sex: female      Admit Date: 10/16/2024    LOS: 7 days     Subjective:   Patient seen at bedside. Alert and awake, no acute distress.   Denies shortness of breath, on RA.  Swelling in BLE + but improving with good output, 1.5 L UOP overnight.    Repeat labs show creatinine of 2.2, stable.     Current Facility-Administered Medications   Medication Dose Route Frequency    0.9 % sodium chloride infusion   IntraVENous PRN    pantoprazole (PROTONIX) tablet 40 mg  40 mg Oral QAM AC    piperacillin-tazobactam (ZOSYN) 3,375 mg in sodium chloride 0.9 % 50 mL IVPB (mini-bag)  3,375 mg IntraVENous Q12H    furosemide (LASIX) injection 40 mg  40 mg IntraVENous BID    albuterol sulfate HFA (PROVENTIL;VENTOLIN;PROAIR) 108 (90 Base) MCG/ACT inhaler 2 puff  2 puff Inhalation BID RT    azithromycin (ZITHROMAX) tablet 500 mg  500 mg Oral Daily    [Held by provider] sodium bicarbonate tablet 1,300 mg  1,300 mg Oral BID    calcitRIOL (ROCALTROL) capsule 0.5 mcg  0.5 mcg Oral Daily    calcium carbonate (TUMS) chewable tablet 1,000 mg  1,000 mg Oral BID    ipratropium 0.5 mg-albuterol 2.5 mg (DUONEB) nebulizer solution 1 Dose  1 Dose Inhalation Q4H PRN    polyethylene glycol (GLYCOLAX) packet 17 g  17 g Oral BID    [Held by provider] pomalidomide (POMALYST) chemo capsule 2 mg (Patient Supplied)  2 mg Oral Nightly    epoetin juvenal-epbx (RETACRIT) injection 10,000 Units  10,000 Units SubCUTAneous Weekly    0.9 % sodium chloride infusion   IntraVENous PRN    sodium chloride flush 0.9 % injection 5-40 mL  5-40 mL IntraVENous 2 times per day    sodium chloride flush 0.9 % injection 5-40 mL  5-40 mL IntraVENous PRN    0.9 % sodium chloride infusion   IntraVENous PRN    ondansetron (ZOFRAN-ODT) disintegrating tablet 4 mg  4 mg Oral Q8H PRN    Or    ondansetron (ZOFRAN) injection 4 mg  4 mg IntraVENous Q6H PRN    polyethylene 
Renal Progress Note    Patient: Brenda Olivas MRN: [id number]  SSN: [id number]    YOB: 1945  Age: 79 y.o.  Sex: female      Admit Date: 10/16/2024    LOS: 3 days     Subjective:   Patient seen at bedside, sitting up in bedside chair. Alert and awake, no acute distress.   Denies shortness of breath, on 3 L NC>  Swelling in BLE mildly improved.     Repeat labs show creatinine of 2.4, CO2 21, calcium 6.4.     Current Facility-Administered Medications   Medication Dose Route Frequency    amoxicillin-clavulanate (AUGMENTIN) 500-125 MG per tablet 1 tablet  1 tablet Oral 2 times per day    furosemide (LASIX) injection 20 mg  20 mg IntraVENous BID    calcium gluconate 2,000 mg in sodium chloride 100 mL  2,000 mg IntraVENous Once    potassium chloride (KLOR-CON M) extended release tablet 40 mEq  40 mEq Oral Q4H    azithromycin (ZITHROMAX) tablet 500 mg  500 mg Oral Daily    sodium bicarbonate tablet 1,300 mg  1,300 mg Oral BID    calcitRIOL (ROCALTROL) capsule 0.5 mcg  0.5 mcg Oral Daily    calcium carbonate (TUMS) chewable tablet 1,000 mg  1,000 mg Oral BID    ipratropium 0.5 mg-albuterol 2.5 mg (DUONEB) nebulizer solution 1 Dose  1 Dose Inhalation Q4H PRN    polyethylene glycol (GLYCOLAX) packet 17 g  17 g Oral BID    pomalidomide (POMALYST) chemo capsule 2 mg (Patient Supplied)  2 mg Oral Nightly    epoetin juvenal-epbx (RETACRIT) injection 10,000 Units  10,000 Units SubCUTAneous Weekly    0.9 % sodium chloride infusion   IntraVENous PRN    sodium chloride flush 0.9 % injection 5-40 mL  5-40 mL IntraVENous 2 times per day    sodium chloride flush 0.9 % injection 5-40 mL  5-40 mL IntraVENous PRN    0.9 % sodium chloride infusion   IntraVENous PRN    ondansetron (ZOFRAN-ODT) disintegrating tablet 4 mg  4 mg Oral Q8H PRN    Or    ondansetron (ZOFRAN) injection 4 mg  4 mg IntraVENous Q6H PRN    polyethylene glycol (GLYCOLAX) packet 17 g  17 g Oral Daily PRN    acetaminophen (TYLENOL) tablet 650 mg  650 mg Oral 
Renal Progress Note    Patient: Brenda Olivas MRN: [id number]  SSN: [id number]    YOB: 1945  Age: 79 y.o.  Sex: female      Admit Date: 10/16/2024    LOS: 4 days     Subjective:   Patient seen at bedside. Alert and awake, no acute distress.   Denies shortness of breath, on 1 L NC.  Swelling in BLE + but improving with good output    Repeat labs show creatinine of 2.3, CO2 20, calcium 7.3.     Current Facility-Administered Medications   Medication Dose Route Frequency    furosemide (LASIX) injection 40 mg  40 mg IntraVENous BID    albuterol sulfate HFA (PROVENTIL;VENTOLIN;PROAIR) 108 (90 Base) MCG/ACT inhaler 2 puff  2 puff Inhalation BID RT    amoxicillin-clavulanate (AUGMENTIN) 500-125 MG per tablet 1 tablet  1 tablet Oral 2 times per day    azithromycin (ZITHROMAX) tablet 500 mg  500 mg Oral Daily    sodium bicarbonate tablet 1,300 mg  1,300 mg Oral BID    calcitRIOL (ROCALTROL) capsule 0.5 mcg  0.5 mcg Oral Daily    calcium carbonate (TUMS) chewable tablet 1,000 mg  1,000 mg Oral BID    ipratropium 0.5 mg-albuterol 2.5 mg (DUONEB) nebulizer solution 1 Dose  1 Dose Inhalation Q4H PRN    polyethylene glycol (GLYCOLAX) packet 17 g  17 g Oral BID    [Held by provider] pomalidomide (POMALYST) chemo capsule 2 mg (Patient Supplied)  2 mg Oral Nightly    epoetin juvenal-epbx (RETACRIT) injection 10,000 Units  10,000 Units SubCUTAneous Weekly    0.9 % sodium chloride infusion   IntraVENous PRN    sodium chloride flush 0.9 % injection 5-40 mL  5-40 mL IntraVENous 2 times per day    sodium chloride flush 0.9 % injection 5-40 mL  5-40 mL IntraVENous PRN    0.9 % sodium chloride infusion   IntraVENous PRN    ondansetron (ZOFRAN-ODT) disintegrating tablet 4 mg  4 mg Oral Q8H PRN    Or    ondansetron (ZOFRAN) injection 4 mg  4 mg IntraVENous Q6H PRN    polyethylene glycol (GLYCOLAX) packet 17 g  17 g Oral Daily PRN    acetaminophen (TYLENOL) tablet 650 mg  650 mg Oral Q6H PRN    Or    acetaminophen (TYLENOL) 
Result Compatible         Case discussed with Dr. Freitas and our impression and recommendations are as follows:  HFpEF  Previously low EF, normal diastolic function and EF per most recent echocardiogram  Continue IV diuresis while inpatient  Recommend 40mg Lasix daily at discharge   Need strict I&Os, daily weight  Repeat echo this admission with EF 55-60%  Keep electrolytes WNL, K+ 4-5, Mg >2   GDMT once able   Near syncope  Troponin negative x 2   Continue telemetry   Will plan for OP holter/MCOT   Leg swelling/bruising, venous duplex negative for DVT   Atrial fibrillation, s/p ablations. Some occasional high rates, possible AF vs ST with ectopy, consider low dose BB if this continues. Will place holter monitor in the OP setting. Not currently on OAC given anemia   ASHANTI on CKD, baseline unclear, nephrology following   Hypertension, BP stable. Continue to monitor with diuresis   Multiple myeloma     Stable for discharge from cardiac standpoint. Will need outpatient follow-up within the next two weeks.     Please do not hesitate to call if additional questions arise.    ADEOLA CARMEN, APRN - NP  10/23/2024  
WBC 1.9 (L) 3.6 - 11.0 K/uL    RBC 2.31 (L) 3.80 - 5.20 M/uL    Hemoglobin 7.7 (L) 11.5 - 16.0 g/dL    Hematocrit 23.3 (L) 35.0 - 47.0 %    .9 (H) 80.0 - 99.0 FL    MCH 33.3 26.0 - 34.0 PG    MCHC 33.0 30.0 - 36.5 g/dL    RDW 22.5 (H) 11.5 - 14.5 %    Platelets 129 (L) 150 - 400 K/uL    MPV 11.0 8.9 - 12.9 FL    Nucleated RBCs 0.0 0.0  WBC    nRBC 0.00 0.00 - 0.01 K/uL    Neutrophils % 27 (L) 32 - 75 %    Band Neutrophils 1 0 - 6 %    Lymphocytes % 46 12 - 49 %    Monocytes % 24 (H) 5 - 13 %    Eosinophils % 2 0 - 7 %    Basophils % 0 0 - 1 %    Immature Granulocytes % 0 %    Neutrophils Absolute 0.5 (L) 1.8 - 8.0 K/UL    Lymphocytes Absolute 0.9 0.8 - 3.5 K/UL    Monocytes Absolute 0.5 0.0 - 1.0 K/UL    Eosinophils Absolute 0.0 0.0 - 0.4 K/UL    Basophils Absolute 0.0 0.0 - 0.1 K/UL    Immature Granulocytes Absolute 0.0 K/UL    Differential Type Manual      RBC Comment Anisocytosis  1+        RBC Comment Ovalocytes  1+       Basic Metabolic Panel    Collection Time: 10/21/24  3:05 AM   Result Value Ref Range    Sodium 143 136 - 145 mmol/L    Potassium 4.0 3.5 - 5.1 mmol/L    Chloride 112 (H) 97 - 108 mmol/L    CO2 24 21 - 32 mmol/L    Anion Gap 7 2 - 12 mmol/L    Glucose 72 65 - 100 mg/dL    BUN 36 (H) 6 - 20 mg/dL    Creatinine 2.11 (H) 0.55 - 1.02 mg/dL    BUN/Creatinine Ratio 17 12 - 20      Est, Glom Filt Rate 24 (L) >60 ml/min/1.73m2    Calcium 7.3 (L) 8.5 - 10.1 mg/dL   C-Reactive Protein    Collection Time: 10/21/24  3:05 AM   Result Value Ref Range    CRP 2.94 (H) 0.00 - 0.30 mg/dL   Brain Natriuretic Peptide    Collection Time: 10/21/24  3:49 AM   Result Value Ref Range    NT Pro-BNP 10,941 (H) <450 pg/mL      Assessment and Plan:     Kappa light chain multiple myeloma: Reportedly diagnosed in 2018  -Currently receiving her treatments at [facility]  -Based on the note review she appears to be in Pomalyst since February 2022.  -Apparently she had slight increase in her light chains 
bulky gauze dressing, clean, dry and intact, not removed at this time. Seep photo below   Skin:     Findings: No rash.   Neurological:      General: No focal deficit present.      Mental Status: She is alert and oriented to person, place, and time.   Psychiatric:         Mood and Affect: Mood normal.         Behavior: Behavior normal.         Thought Content: Thought content normal.         Judgment: Judgment normal.         Media Information    Document Information     Wound Care Image: Wound   Right lower leg   10/17/2024 10:58   Attached To:   Hospital Encounter on 10/16/24   Source Information     Chelsey Orozco RN  [facility] [address] [place] Cardiac Telemetry   Document History       Labs/Imaging/Diagnostics    Labs:  CBC:  Recent Labs     10/21/24  0305 10/22/24  0750 10/23/24  0137   WBC 1.9* 2.3* 2.7*   RBC 2.31* 2.32* 1.97*   HGB 7.7* 7.9* 6.7*   HCT 23.3* 24.0* 20.1*   .9* 103.4* 102.0*   RDW 22.5* 22.5* 21.7*   * 139* 124*     CHEMISTRIES:  Recent Labs     10/21/24  0305 10/21/24  0349 10/22/24  0750 10/23/24  0137     --  141 139   K 4.0  --  4.3 3.7   *  --  110* 106   CO2 24  --  27 25   BUN 36*  --  34* 34*   CREATININE 2.11*  --  2.32* 2.28*   GLUCOSE 72  --  76 84   PHOS  --   --  2.7 2.6   MG  --  1.3* 2.9*  --       Latest Reference Range & Units 10/22/24 07:50   IgA 70 - 400 mg/dL 568 (H)   Total IgG 700 - 1600 mg/dL 758   IgM 40 - 230 mg/dL 61       Procal 0.17 <0.17     CRP 1.43 <2.02 <2.94 <4.14 < 5.47          Wound culture right leg (10/18) Heavy Pseudomonas putida    Antibiotic Interpretation Microscan    amikacin Sensitive <=2 ug/mL   cefTAZidime Sensitive 4 ug/mL   cefTRIAXone Intermediate 16 ug/mL   cefepime Sensitive <=1 ug/mL   ciprofloxacin Sensitive <=0.25 ug/mL   gentamicin Sensitive <=1 ug/mL   levofloxacin Sensitive 1 ug/mL   tobramycin Sensitive <=1 ug/mL     Imaging Last 24 Hours:   CT CHEST WO CONTRAST     Result Date: 10/17/2024  1.  Few patchy nodular solid and 
10/16/2024  Diffuse soft tissue swelling. No acute osseous abnormality. Electronically signed by Murray Barrera MD         Hospital Problems             Last Modified POA    * (Principal) CHF (congestive heart failure), NYHA class I, acute on chronic, combined (HCC) (Chronic) 10/17/2024 Yes    Anemia (Chronic) 10/17/2024 Yes    CKD (chronic kidney disease) 10/17/2024 Yes    HTN (hypertension) (Chronic) 10/17/2024 Yes    Depression (Chronic) 10/17/2024 Yes    ASHANTI (acute kidney injury) (HCC) 10/17/2024 Yes      Wound infection, right leg, secondary to Pseudomonas putida, Day #2 IV Zosyn  Possible aspiration pneumonia, RLL, Day#2 IV Zosyn  Left sphenoid sinusitis, on IV Zosyn  Patulous esophagus  Pancytopenia  Multiple myeloma     Comment: Again Zosyn alone should be adequate coverage for wound infection, aspiration pneumonia and sinusitis.   Pseudomonas putida occasionally resistant to Zosyn but she appears to be responding with decreasing CRP.          Plan   1. Continue IV Zosyn; may be able to switch to Augmentin for aspiration pneumonia, but this would not cover Pseudomonas; possibly Ciprofloxacin or Levaquin  2. In am, repeat CBC, procal and CRP, follow-up quantitative immunoglobulins  3. Follow-up wound culture    Electronically signed by Mason Lynn MD      
Johant PENDING       Assessment and Plan:     Kappa light chain multiple myeloma: Reportedly diagnosed in 2018  -Currently receiving her treatments at [facility]  -Based on the note review she appears to be in Pomalyst since February 2022.  -Apparently she had slight increase in her light chains slowly over the past year so repeat bone marrow biopsy was performed 2 weeks ago; shows residual plasma cell clone of less than 5%.  -Previous bone marrow biopsy had to be reviewed to see if this is stable disease or progression   -Currently, I do not feel, recurrence is the reason for her pancytopenia  -She also gets Xgeva for her bone lesions     Pancytopenia:   -has significant pancytopenia now likely from the Pomalyst, concurrent infection likely is contributing to the worsening leukopenia  -Recommend to hold Pomalyst while inpatient until ANC recovers.  -Today's CBC shows a white count of 2.7, hemoglobin 6.7 and platelets 124,000 the ANC is 900   -10/23 1 unit of pRBC ordered  -Consider filgrastim if she spikes any fever    -Monitor ANC; initiated neutropenic precautions  -S/p 1 unit of pRBC on 10/16 for Hgb of 6.7 g/dL; anemia workup is normal with normal iron studies; high B12 and folic acid levels  -On EPO injections     Bilateral lower extremity edema and elevated BNP:   -Being treated for heart failure  -Currently on IV Lasix; repeat echo is felt to be normal around 55 to 60%     Near syncope/syncope unclear etiology  -Lower extremity Dopplers negative for DVT  -Being worked up for cardiac causes per primary team    Right lower extremity wound  -Wound care following, on p.o. antibiotics  -Reportedly result of a fall    Advised patient to follow-up with U after discharge      I have seen, examined and evaluated the patient with Karin Johns NP under my direct supervision. I have reviewed the note and agree with the assessment and plan of care as documented.   HPI, ROS, physical examination and 
and returned to lowest position, Call bell within reach, Updated patient's board on functional status and mobility recommendations, and pt left on bedside commode with nursing in the room .    COMMUNICATION/EDUCATION:   The patient’s plan of care was discussed with: Physical therapist and Registered nurse    Patient Education  Education Given To: Patient  Education Provided: Plan of Care;Transfer Training  Education Method: Demonstration;Verbal  Barriers to Learning: None  Education Outcome: Verbalized understanding;Demonstrated understanding;Continued education needed    Thank you for this referral.  ZURI Dinero  Minutes: 66    
motility  Continue to monitor H&H     5. SOB  Fluid overload/lower extremity edema:   Echo showed EF of 55-60%, normal diastolic function  Probably with severe hypoalb  Continue Lasix 40 mg IV BID  Advised to maintain p.o. fluid restriction of 1200 ml per day and and salt restriction, check I/Os, daily weights  Venous Dopplers of BLE show no e/o DVT  Will monitor fluid status clinically and adjust diuretics as needed.  CT chest showed few patchy nodular solid and groundglass opacities in RLL, patulous esophagus with fluid/debris--> antibiotic therapy started for concerns of aspiration PNA  TERENCE pending    6. Multiple myeloma  On Pomalyst     7. RLE wound  Wound cxs grew heavy pseudomonas putida, on Zosyn    8. Hypokalemia, resolved  Potassium 4.3, recheck tomorrow    Signed By: CATHIE Haley - CNP     October 22, 2024    Addendum:    Rounds made along with Stephanie Cho NP  Patient seen and examined at bedside,   Labs and treatments reviewed  Plan discussed with patient and NP  Reviewed NP's  notes, amended and agree with assessment and plan    Shirley Saldaña MD  
  CK    Collection Time: 10/18/24  4:42 AM   Result Value Ref Range    Total CK 96 26 - 192 U/L   Phosphorus    Collection Time: 10/18/24  4:42 AM   Result Value Ref Range    Phosphorus 3.2 2.6 - 4.7 mg/dL   Protein / creatinine ratio, urine    Collection Time: 10/18/24  6:55 AM   Result Value Ref Range    Protein, Urine, Random 24 (H) 0.0 - 11.9 mg/dL    Creatinine, Ur 22.00 mg/dL    PROTEIN/CREAT RATIO URINE RAN 1.1          Assessment and Plan:     1. Acute Kidney Injury on CKD: cardiorenal etiology  Underlying CKD stage 4  Creatinine was 2.5 on admission, creatinine is 2.4 today  Hold monopril, chlorthalidone, and HCTZ for now  Continue Lasix 40 mg IV BID  Urinalysis not done, urine PCR is 1.1 g proteinuria   No rhabdo  Strict I/O's, excellent UOP  Renal US shows medical renal disease, no hydronephrosis   Will continue to monitor renal functions and adjust management as needed    2. Chronic kidney disease: probably has stage 4 chronic kidney disease   eGFR has been in range of CKD stage 4, most recent creatinine in September 2024 was 1.96  Renal osteodystrophy:   calcium is low at 6.4, ( corrected 8.0)  Received calcium gluconate 1 g IV overnight, start calcium carbonate 1 G po BID  Phosphorus level is okay  iPTH is > 2000, probably with severe hypocalcemia, started calcitriol 0.5 mcg daily  We will continue to monitor renal functions to assess the baseline    3. Hypertension: Controlled  Not on any blood pressure medication  Advised patient to maintain salt restriction   Will continue to monitor blood pressures and adjust antihypertensive regimen as needed.    3. Metabolic acidosis: Secondary to ASHANTI on CKD  CO2 level was 16 on admission, 17 today  Will give 1 A sodium bicarb IVP   Increase sodium bicarbonate to 1300 mg BID  Will continue to monitor CO2 levels     4. Anemia: Secondary to multiple myeloma and chronic kidney disease  Hemoglobin 7.8, s/p 1 unit PRBCs  No CRYSTAL   Recommend to give epogen 10,000 units

## 2024-10-23 NOTE — DISCHARGE SUMMARY
known as: HYDRODIURIL     lisinopril 20 MG tablet  Commonly known as: PRINIVIL;ZESTRIL     Myrbetriq 25 MG Tb24  Generic drug: mirabegron     omeprazole 40 MG delayed release capsule  Commonly known as: PRILOSEC     ondansetron 4 MG tablet  Commonly known as: ZOFRAN     sucralfate 1 GM tablet  Commonly known as: CARAFATE               Where to Get Your Medications        These medications were sent to [facility]/pharmacy #[id number] - [place], [place] - [address] [place] [address] -  [phone] - F [phone]  [address] [place] [address] [place] [address]      Phone: [phone]   calcitRIOL 0.5 MCG capsule  calcium carbonate 500 MG chewable tablet  fluticasone-salmeterol 250-50 MCG/ACT Aepb diskus inhaler  levoFLOXacin 500 MG tablet  mirtazapine 15 MG tablet           Follow up Care:    Follow-up Information       Follow up With Specialties Details Why Contact Info    Rob Verduzco MD Cardiothoracic Surgery, Cardiac Electrophysiology, Cardiology, Interventional Cardiology Follow up in 2 week(s)  [address] [place] [address]  [phone]      Aida Frazier MD Internal Medicine Follow up in 1 week(s)  [address] [facility] [address] [phone]      Pepito Reinoso MD General Surgery, Vascular Surgery Follow up in 2 week(s) Leg wound and evaluate for peripheral vascular disease [address] [facility] [id number] [phone]                   Diet:  cardiac diet    Disposition:  Skilled nursing facility.    Advanced Directive:   FULL    DNR      Discharge Exam:                     Vitals:    10/23/24 0808   BP: 113/76   Pulse: 85   Resp:    Temp: 98.6 °F (37 °C)   SpO2: 99%      General:  Alert, cooperative, no distress, appears stated age.   Lungs:   Clear to auscultation bilaterally.   Chest wall:  No tenderness or deformity.   Heart:  Regular rate and rhythm, S1, S2 normal, no murmur, click, rub or gallop.   Abdomen:   Soft, non-tender. Bowel sounds

## 2024-10-23 NOTE — PLAN OF CARE
PHYSICAL THERAPY TREATMENT     Patient: Brenda Olivas (79 y.o. female)  Date: 10/22/2024  Diagnosis: Shortness of breath [R06.02]  CHF (congestive heart failure), NYHA class I, acute on chronic, combined (HCC) [I50.43]  Acute on chronic congestive heart failure, unspecified heart failure type (HCC) [I50.9] CHF (congestive heart failure), NYHA class I, acute on chronic, combined (HCC)  Procedure(s) (LRB):  ESOPHAGOGASTRODUODENOSCOPY (N/A) 4 Days Post-Op  Precautions: Fall Risk                      Recommendations for nursing mobility: Out of bed to chair for meals, Encourage HEP in prep for ADLs/mobility; see handout for details, AD and gt belt for bed to chair , Amb to bathroom with AD and gait belt, and Assist x1    In place during session: External Catheter and EKG/telemetry   Chart, physical therapy assessment, plan of care and goals were reviewed.  ASSESSMENT  Patient continues with skilled PT services and is slowly progressing towards goals. Pt semi-supine upon PT arrival, agreeable to session. Pt A&O x 4. (See below for objective details and assist levels).     Overall pt tolerated session fair today with 8/10 pain reported in lower legs.  Pt requiring CG-min A for transfers today with cues for hand placement during transfers.  Pt denies any dizziness or lightheadedness with sitting or standing today.  Pt progressed gait distance today to 80 ft with standing rest break for 10 seconds at 40 ft.  Pt amb with slow, shuffling steps requiring cues to increase step length.  Pt fatigues easily and pt's HR irma to 136 bpm during gait session.  RN notified.  Pt exhibits slight SOB from gait session once returning to chair; however, unable to obtain a pulse ox reading.  Pt's HR dropped to 104 bpm once resting.  Pt instructed in seated HEP and given written copy of exercises.  Will continue to benefit from skilled PT services, and will continue to progress as tolerated. Current PT DC recommendation Moderate 
         PHYSICAL THERAPY TREATMENT     Patient: Brenda Olivas (79 y.o. female)  Date: 10/23/2024  Diagnosis: Shortness of breath [R06.02]  CHF (congestive heart failure), NYHA class I, acute on chronic, combined (HCC) [I50.43]  Acute on chronic congestive heart failure, unspecified heart failure type (HCC) [I50.9] CHF (congestive heart failure), NYHA class I, acute on chronic, combined (HCC)  Procedure(s) (LRB):  ESOPHAGOGASTRODUODENOSCOPY (N/A) 5 Days Post-Op  Precautions: Fall Risk                Recommendations for nursing mobility: Out of bed to chair for meals, Encourage HEP in prep for ADLs/mobility; see handout for details, Frequent repositioning to prevent skin breakdown, LE elevation for management of edema, AD and gt belt for bed to chair , Amb to bathroom with AD and gait belt, Amb in hallway, and Assist x1    In place during session: EKG/telemetry   Chart, physical therapy assessment, plan of care and goals were reviewed.  ASSESSMENT  Patient continues with skilled PT services and is progressing towards goals. Pt semi-supine upon PT arrival, agreeable to session. Pt A&O x 4.    Overall pt tolerated session fair today with report of intermittent 10/10 RLE pain at wound site and shortness of breath with activity. Patient required CGA x 1 for rolling and Min A x 1 for supine <> sit and scooting bed mobility. At EOB, PT observed that patient had BM, so new gown was provided and patient stood EOB so that personal hygiene could be performed. Patient stated that they still needed to have a BM so ambulated 20 ft to toilet. At toilet, patient demonstrated good seated balance while using handrail for additional support. Patient required CGA x 1 for sit <> stand transfer from toilet and needed assistance from PT to perform personal hygiene. Patient then ambulated back to recliner and reported shortness of breath. SPO2 vital unable to obtained due to poor perfusion through patient fingertips, however telemetry 
  Problem: Chronic Conditions and Co-morbidities  Goal: Patient's chronic conditions and co-morbidity symptoms are monitored and maintained or improved  10/19/2024 1135 by Aida Sullivan, RN  Outcome: Progressing  10/19/2024 0209 by Yan Salcedo RN  Outcome: Progressing  Flowsheets (Taken 10/18/2024 2300)  Care Plan - Patient's Chronic Conditions and Co-Morbidity Symptoms are Monitored and Maintained or Improved:   Monitor and assess patient's chronic conditions and comorbid symptoms for stability, deterioration, or improvement   Collaborate with multidisciplinary team to address chronic and comorbid conditions and prevent exacerbation or deterioration     Problem: Discharge Planning  Goal: Discharge to home or other facility with appropriate resources  10/19/2024 1135 by Aida Sullivan, RN  Outcome: Progressing  10/19/2024 0209 by Yan Salcedo RN  Outcome: Progressing  Flowsheets (Taken 10/18/2024 2300)  Discharge to home or other facility with appropriate resources: Identify barriers to discharge with patient and caregiver     Problem: Pain  Goal: Verbalizes/displays adequate comfort level or baseline comfort level  10/19/2024 1135 by Aida Sullivan, RN  Outcome: Progressing  10/19/2024 0209 by Yan Salcedo RN  Outcome: Progressing     Problem: Safety - Adult  Goal: Free from fall injury  10/19/2024 1135 by Aida Sullivan, RN  Outcome: Progressing  10/19/2024 0209 by Yan Salcedo RN  Outcome: Progressing     Problem: ABCDS Injury Assessment  Goal: Absence of physical injury  10/19/2024 1135 by Aida Sullivan, RN  Outcome: Progressing  10/19/2024 0209 by Yan Salcedo RN  Outcome: Progressing     Problem: Skin/Tissue Integrity  Goal: Absence of new skin breakdown  Description: 1.  Monitor for areas of redness and/or skin breakdown  2.  Assess vascular access sites hourly  3.  Every 4-6 hours minimum:  Change oxygen saturation probe site  4.  Every 4-6 hours:  If on nasal continuous positive 
  Problem: Chronic Conditions and Co-morbidities  Goal: Patient's chronic conditions and co-morbidity symptoms are monitored and maintained or improved  10/21/2024 2131 by Torres Cisse RN  Outcome: Progressing  Flowsheets (Taken 10/21/2024 2000)  Care Plan - Patient's Chronic Conditions and Co-Morbidity Symptoms are Monitored and Maintained or Improved:   Collaborate with multidisciplinary team to address chronic and comorbid conditions and prevent exacerbation or deterioration   Monitor and assess patient's chronic conditions and comorbid symptoms for stability, deterioration, or improvement  10/21/2024 1837 by Dario Das RN  Flowsheets (Taken 10/18/2024 2300 by Yan Salcedo, RN)  Care Plan - Patient's Chronic Conditions and Co-Morbidity Symptoms are Monitored and Maintained or Improved:   Monitor and assess patient's chronic conditions and comorbid symptoms for stability, deterioration, or improvement   Collaborate with multidisciplinary team to address chronic and comorbid conditions and prevent exacerbation or deterioration     Problem: Discharge Planning  Goal: Discharge to home or other facility with appropriate resources  10/21/2024 2131 by Torres Cisse RN  Outcome: Progressing  Flowsheets (Taken 10/21/2024 2000)  Discharge to home or other facility with appropriate resources:   Identify barriers to discharge with patient and caregiver   Arrange for needed discharge resources and transportation as appropriate  10/21/2024 1837 by Dario Das RN  Flowsheets (Taken 10/18/2024 2300 by Yan Salcedo, RN)  Discharge to home or other facility with appropriate resources: Identify barriers to discharge with patient and caregiver     Problem: Pain  Goal: Verbalizes/displays adequate comfort level or baseline comfort level  10/21/2024 2131 by Torres Cisse RN  Outcome: Progressing  Flowsheets (Taken 10/21/2024 1915)  Verbalizes/displays adequate comfort level or baseline comfort level:   Encourage patient to 
  Problem: Chronic Conditions and Co-morbidities  Goal: Patient's chronic conditions and co-morbidity symptoms are monitored and maintained or improved  10/23/2024 0828 by Marc Barrios RN  Outcome: Progressing  10/22/2024 2047 by Yennifer Caldwell RN  Outcome: Progressing     Problem: Discharge Planning  Goal: Discharge to home or other facility with appropriate resources  10/23/2024 0828 by Marc Barrios RN  Outcome: Progressing  10/22/2024 2047 by Yennifer Caldwell RN  Outcome: Progressing     Problem: Pain  Goal: Verbalizes/displays adequate comfort level or baseline comfort level  10/23/2024 0828 by Marc Barrios RN  Outcome: Progressing  10/22/2024 2047 by Yennifer Caldwell RN  Outcome: Progressing     Problem: Safety - Adult  Goal: Free from fall injury  10/23/2024 0828 by Marc Barrios RN  Outcome: Progressing  10/22/2024 2047 by Yennifer Caldwell RN  Outcome: Progressing     Problem: ABCDS Injury Assessment  Goal: Absence of physical injury  10/23/2024 0828 by Marc Barrios RN  Outcome: Progressing  10/22/2024 2047 by Yennifer Caldwell RN  Outcome: Progressing     Problem: Skin/Tissue Integrity  Goal: Absence of new skin breakdown  Description: 1.  Monitor for areas of redness and/or skin breakdown  2.  Assess vascular access sites hourly  3.  Every 4-6 hours minimum:  Change oxygen saturation probe site  4.  Every 4-6 hours:  If on nasal continuous positive airway pressure, respiratory therapy assess nares and determine need for appliance change or resting period.  10/23/2024 0828 by Marc Barrios RN  Outcome: Progressing  10/22/2024 2047 by Yennifer Caldwell RN  Outcome: Progressing     
  Problem: Chronic Conditions and Co-morbidities  Goal: Patient's chronic conditions and co-morbidity symptoms are monitored and maintained or improved  Outcome: Progressing     Problem: Discharge Planning  Goal: Discharge to home or other facility with appropriate resources  Outcome: Progressing     Problem: Pain  Goal: Verbalizes/displays adequate comfort level or baseline comfort level  Outcome: Progressing     Problem: Safety - Adult  Goal: Free from fall injury  Outcome: Progressing     Problem: ABCDS Injury Assessment  Goal: Absence of physical injury  Outcome: Progressing     Problem: Skin/Tissue Integrity  Goal: Absence of new skin breakdown  Description: 1.  Monitor for areas of redness and/or skin breakdown  2.  Assess vascular access sites hourly  3.  Every 4-6 hours minimum:  Change oxygen saturation probe site  4.  Every 4-6 hours:  If on nasal continuous positive airway pressure, respiratory therapy assess nares and determine need for appliance change or resting period.  Outcome: Progressing     
  Problem: Chronic Conditions and Co-morbidities  Goal: Patient's chronic conditions and co-morbidity symptoms are monitored and maintained or improved  Outcome: Progressing     Problem: Discharge Planning  Goal: Discharge to home or other facility with appropriate resources  Outcome: Progressing     Problem: Safety - Adult  Goal: Free from fall injury  Outcome: Progressing     Problem: ABCDS Injury Assessment  Goal: Absence of physical injury  Outcome: Progressing     Problem: Skin/Tissue Integrity  Goal: Absence of new skin breakdown  Description: 1.  Monitor for areas of redness and/or skin breakdown  2.  Assess vascular access sites hourly  3.  Every 4-6 hours minimum:  Change oxygen saturation probe site  4.  Every 4-6 hours:  If on nasal continuous positive airway pressure, respiratory therapy assess nares and determine need for appliance change or resting period.  Outcome: Progressing     Problem: Physical Therapy - Adult  Goal: By Discharge: Performs mobility at highest level of function for planned discharge setting.  See evaluation for individualized goals.  Description: FUNCTIONAL STATUS PRIOR TO ADMISSION: The patient  required minimal assistance for basic and instrumental ADLs and used assistive device. and The patient endorse falls within the last few months    HOME SUPPORT PRIOR TO ADMISSION: The patient lived with spouse to provide assistance.    Physical Therapy Goals  Initiated 10/17/2024  Pt stated goal: I want to go home  Pt will be I with LE HEP in 7 days.  Pt will perform bed mobility with Stand by Assist in 7 days.  Pt will perform transfers with Stand by Assist in 7 days.   Pt will amb  feet with LRAD safely with Stand by Assist in 7 days.   Pt will demonstrate improvement in static and dynamic balance from Contact Guard Assist to Stand by Assist in 7 days.     10/17/2024 1152 by Anais Carrera, SPT  Outcome: Progressing     Problem: Occupational Therapy - Adult  Goal: By 
  Problem: SLP Adult - Impaired Swallowing  Goal: By Discharge: Advance to least restrictive diet without signs or symptoms of aspiration for planned discharge setting.  See evaluation for individualized goals.  Description: Speech Therapy Swallow Goals  Initiated 10/19/2024  -Patient will tolerate IDDSI 6 Soft and Bite-sized Solids / Thin Liquids diet without clinical indicators of aspiration within 7 day(s).        -Patient will tolerate PO trials without clinical indicators of aspiration within 7 day(s).      -Patient will participate in modified barium swallow study within 7 day(s) as indicated.      -Patient will demonstrate understanding of swallow safety precautions and aspiration precautions, diet recs with min cues within 7day(s).    -Patient/caregiver goal: To be able to eat more easily and have more of an appetite.            10/21/2024 1521 by Julieta Glover, SLP  Outcome: Progressing     
OCCUPATIONAL THERAPY EVALUATION  Patient: Brenda Olivas (79 y.o. female)  Date: 10/17/2024  Primary Diagnosis: Shortness of breath [R06.02]  CHF (congestive heart failure), NYHA class I, acute on chronic, combined (HCC) [I50.43]  Acute on chronic congestive heart failure, unspecified heart failure type (HCC) [I50.9]       Precautions: Fall Risk                Recommendations for nursing mobility: Out of bed to chair for meals, Frequent repositioning to prevent skin breakdown, Use of bed/chair alarm for safety, and Assist x1    In place during session:NC 2L, External catheter, and EKG/telemetry  ASSESSMENT  Pt is a 79 y.o. female presenting to [facility] with c/o syncope, SOB, and LE swelling. Pt is currently being treated for acute chronic congestive heart failure, CHF, SOB. Pt received semi-supine in bed upon arrival, AXO x4, and agreeable to OT evaluation. Pt previously on hold per nsg, however shortly after, pt was cleared to be seen by OT by nursing.    Based on current observations, pt presents with decreased  functional mobility, independence in ADLs, strength, body mechanics, activity tolerance, endurance (see below for objective details and assist levels).     Overall, pt tolerates session fair with no c/o pain during session. Pt presented with limitations in shoulder flexion, however pt reports chronic limitations due to arthritis. Pt was able to complete functional reaching in all available planes of motion with no c/o pain. Pt completed oral hygiene while seated in recliner. Pt declined getting up from chair, as PT session recently ended. Pt will require further assessment  by OT for OOB functional mobility assessment. Pt will benefit from continued skilled OT services to address current impairments and improve IND and safety with self cares and functional transfers/mobility. Current OT d/c recommendation Moderate intensity short-term skilled occupational therapy up to 5x/week once medically 
OCCUPATIONAL THERAPY TREATMENT  Patient: Brenda Olivas (79 y.o. female)  Date: 10/18/2024  Primary Diagnosis: Shortness of breath [R06.02]  CHF (congestive heart failure), NYHA class I, acute on chronic, combined (HCC) [I50.43]  Acute on chronic congestive heart failure, unspecified heart failure type (HCC) [I50.9]  Procedure(s) (LRB):  ESOPHAGOGASTRODUODENOSCOPY (N/A)     Precautions: Fall Risk                Recommendations for nursing mobility: Out of bed to chair for meals, Encourage HEP in prep for ADLs/mobility; see handout for details, Frequent repositioning to prevent skin breakdown, AD and gt belt for bed to chair , Amb to bathroom with AD and gait belt, and Assist x1    In place during session: Peripheral IV, Nasal Cannula 2L, External Catheter, and EKG/telemetry   Chart, occupational therapy assessment, plan of care, and goals were reviewed.  ASSESSMENT  Patient continues with skilled OT services and is progressing towards goals. Pt long sitting upon OT arrival, agreeable to session. Nsg present in room and cleared pt for tx. Pt A&O x 4. (See below for objective details and assist levels).     Overall pt tolerated session fair today with no c/o pain, however pt reported soreness to her sacrum from sitting in bed too long. Pt required CGA for bed mobility in prep for EOB sitting. Pt demonstrated fair sitting balance with UE supported on bed rails. Pt required CGA for STS trials working on improved safety and dynamic standing balance required for toietling.Pt completed functional mobility from bed to toilet with CGA and transfer to toilet with MNI A, cueing required for proper hand placement to decrease risk of falls. Pt tolerated standing at sink for 2 minute interval to complete oral hygiene. Pt required TORRE for oral hygiene due to limited shoulder flexion. CGA was provided to maintain safe dynamic standing balance. Current OT recommendations for discharge . Will continue to benefit from skilled OT 
SPEECH PATHOLOGY MODIFIED BARIUM SWALLOW STUDY  Patient: Brenda Olivas (79 y.o. female)  Date: 10/22/2024  Primary Diagnosis: Shortness of breath [R06.02]  CHF (congestive heart failure), NYHA class I, acute on chronic, combined (HCC) [I50.43]  Acute on chronic congestive heart failure, unspecified heart failure type (HCC) [I50.9]  Procedure(s) (LRB):  ESOPHAGOGASTRODUODENOSCOPY (N/A) 4 Days Post-Op   Precautions: aspiration,  Fall Risk     DIET RECOMMENDATIONS:Easy to chew and thin liquids    SWALLOW SAFETY PRECAUTIONS: slow rate, alternate solids and liquids, 1:1 assistance with ALL PO intake, STRICT aspiration and GERD precautions, monitor pt closely for s/s aspiration, meds crushed if able in applesauce or pudding.    Defer to MD re: further assessment/management of small Zenker's diverticulum present as per discussion with radiology MICHAEL.   ASSESSMENT :  Based on the objective data described below, the patient presents with oropharyngeal sw function WFL, apparent esophageal dysphagia based on MBS observation as well as EGD and CT chest results.     Oral phase WFL. Pt with oral bolus fragmentation that pt clears independently with subsequent swallows.    Pharyngeal phase with mild delay most notable with thin liquids. Once initiated, pharyngeal phase WFL.    Trace flash penetration with 1 of multiple sips of thin via straw.    There is note of small Zenker's diverticulum per discussion with radiology MICHAEL with mild persistent retention noted.   There is noted of slowed esophageal clearance through mid esophagus with intermittent backflow to level below UES.      Patient will benefit from skilled intervention to address the above impairments.    GOALS:  Problem: SLP Adult - Impaired Swallowing  Goal: By Discharge: Advance to least restrictive diet without signs or symptoms of aspiration for planned discharge setting.  See evaluation for individualized goals.  Description: Speech Therapy Swallow Goals  Initiated 
Speech LAnguage Pathology Dysphagia EVALUATION    Patient: Brenda Olivas (79 y.o. female)  Date: 10/19/2024  Primary Diagnosis: Shortness of breath [R06.02]  CHF (congestive heart failure), NYHA class I, acute on chronic, combined (HCC) [I50.43]  Acute on chronic congestive heart failure, unspecified heart failure type (HCC) [I50.9]  Procedure(s) (LRB):  ESOPHAGOGASTRODUODENOSCOPY (N/A) 1 Day Post-Op   Precautions: Aspiration, GERD Fall Risk                  Time In: 1650  Time Out: 1715    DIET RECOMMENDATIONS: Soft and bite sized and mildly thick liquids, meds crushed in puree as able    SWALLOW SAFETY PRECAUTIONS:  Rec slow rate of intake, small bites/sips, 6 small meals, double swallow, liquid wash, remain upright 1 hour after PO and do not eat 3 hours before bedtime.      ASSESSMENT :  Based on the objective data described below, the patient presents with mild to moderate oropharyngeal dysphagia.    78 y/o female with pmh notable for gastric bypass, multiple myeloma on chemo, asthma, gastric bypass, CKD, s/p PCI, GERD, HF, hip fracture s/p surgery.     Patient with history notable for 1/25/24 EGD report \"dehiscence between gastric pouch and remnant stomach.\" Chest CT 10/17 impression with concern for aspiration pna and esophageal dysmotility or reflux. See objective below. Per anesthesiology note, patient underwent EGD yesterday 10/18. Hospitalist note indicates concern for aspiration given reports EGD finding of esophageal abnormalities.    Recommend diet downgrade from Easy to Chew Solids / Thin Liquids to IDDSI 6 Soft and Bite-sized Solids / Mildly Thick Liquids, meds crushed as able in puree.     Unclear of significant of x1 cough observed with thin liquid or if could be attributable at least in part to known esophageal dysmotility. Patient reports consuming soft solids and baseline.     Patient will benefit from skilled intervention to address the above impairments.    GOALS:    Problem: SLP Adult - 
Speech LAnguage Pathology Dysphagia TREATMENT    Patient: Brenda Olivas (79 y.o. female)  Date: 10/21/2024  Primary Diagnosis: Shortness of breath [R06.02]  CHF (congestive heart failure), NYHA class I, acute on chronic, combined (HCC) [I50.43]  Acute on chronic congestive heart failure, unspecified heart failure type (HCC) [I50.9]  Procedure(s) (LRB):  ESOPHAGOGASTRODUODENOSCOPY (N/A) 3 Days Post-Op   Precautions: aspiration Fall Risk                  Time In: 1445  Time Out: 1500    DIET RECOMMENDATIONS: Easy to chew, mildly thick liquids, and unthickened water after oral care    SWALLOW SAFETY PRECAUTIONS: 1:1 assistance with ALL PO intake, STRICT aspiration and GERD precautions, monitor pt closely for s/s aspiration, meds crushed if able in applesauce or pudding, FEED ONLY IF AWAKE AND ALERT.      ASSESSMENT :  Patient seen at bedside.   Minimal intake of SBS diet. Patient reports she does not like current diet recommendations. CT of chest results reviewed as well has EGD results 10/18. Patient remains at risk of aspiration s/t GERD and oropharyngeal weakness.   Patient ingested thin liquids via cup. Swallow initiation appears timely for age. Pharyngeal response reduced to digital palpation. No overt s/sx of aspiration noted. However can not r/o silent aspiration at this time. Brought patient a turkey sandwich to trial. Patient removed the crust independently. Prolonged mastication w/ reduced bolus formation. Timely oral transit and reduced pharyngeal response. No clinical indicators of penetration or aspiration noted.   Educated patient to GERD precautions and diet modifications as well as choosing slick, slippery textures. MBS is warranted to further assess swallow function and r/o  silent aspiration.     Please refer to below for current recs.     Patient will benefit from skilled intervention to address the above impairments.    GOALS:    Problem: SLP Adult - Impaired Swallowing  Goal: By Discharge: 
Speech LAnguage Pathology Dysphagia TREATMENT/DISCHARGE    Patient: Brenda Olivas (79 y.o. female)  Date: 10/23/2024  Primary Diagnosis: Shortness of breath [R06.02]  CHF (congestive heart failure), NYHA class I, acute on chronic, combined (HCC) [I50.43]  Acute on chronic congestive heart failure, unspecified heart failure type (HCC) [I50.9]  Procedure(s) (LRB):  ESOPHAGOGASTRODUODENOSCOPY (N/A) 5 Days Post-Op   Precautions: GERD, aspiration, Fall Risk                  Time In: 1230  Time Out: 1238    DIET RECOMMENDATIONS: Easy to chew and thin liquids    SWALLOW SAFETY PRECAUTIONS: 1:1 assistance with ALL PO intake, STRICT aspiration and GERD precautions, monitor pt closely for s/s aspiration, meds as tolerated, FEED ONLY IF AWAKE AND ALERT.      ASSESSMENT :  Patient seen at chairside eating lunch. Minimal intake observed. Patient denies difficulty w/ ETC, thin texture. MBS results reviewed and educated to patient.   Patient able to recall a few GERD precautions such as sitting up after meals and small bites and sips. Educated patient to remaining GERD precautions: slow rate, smaller more frequent meals vs 3 large meals.   Observed patient consuming lunch. Swallow initiation timely, pharyngeal response appears adequate. No clinical indicators of penetration or aspiration.   Mastication timely for solids. No clinical indicators.     Patient will be discharged from skilled speech-language pathology services at this time.    GOALS:    Problem: SLP Adult - Impaired Swallowing  Goal: By Discharge: Advance to least restrictive diet without signs or symptoms of aspiration for planned discharge setting.  See evaluation for individualized goals.  Description: Speech Therapy Swallow Goals  Initiated 10/19/2024  -Patient will tolerate IDDSI 6 Soft and Bite-sized Solids / Thin Liquids diet without clinical indicators of aspiration within 7 day(s).        -Patient will tolerate PO trials without clinical indicators of 
Therapy - Adult  Goal: By Discharge: Performs mobility at highest level of function for planned discharge setting.  See evaluation for individualized goals.  Description: FUNCTIONAL STATUS PRIOR TO ADMISSION: The patient  required minimal assistance for basic and instrumental ADLs and used assistive device. and The patient endorse falls within the last few months    HOME SUPPORT PRIOR TO ADMISSION: The patient lived with spouse to provide assistance.    Physical Therapy Goals  Initiated 10/17/2024  Pt stated goal: I want to go home  Pt will be I with LE HEP in 7 days.  Pt will perform bed mobility with Stand by Assist in 7 days.  Pt will perform transfers with Stand by Assist in 7 days.   Pt will amb 150 feet with LRAD safely with Stand by Assist in 7 days.   Pt will demonstrate improvement in static and dynamic balance from Contact Guard Assist to Stand by Assist in 7 days.     10/22/2024 1226 by Fatmata Olvera, PT  Outcome: Progressing     Problem: Occupational Therapy - Adult  Goal: By Discharge: Performs self-care activities at highest level of function for planned discharge setting.  See evaluation for individualized goals.  Description: FUNCTIONAL STATUS PRIOR TO ADMISSION:  Prior to recent admission, pt was living with spouse, in a one level home and was requiring assistance with ADLs.     HOME SUPPORT: The patient lived with  who provided assistance with ADLs.    Occupational Therapy Goals:  Initiated 10/17/2024  Patient/Family stated goal: return home  1.  Patient will perform self-feeding with F[place]in within 7 day(s).  2.  Patient will perform grooming with Fannin within 7 day(s).  3.  Patient will perform upper body dressing with Minimal Assist within 7 day(s).  4.  Patient will perform toilet transfers with Minimal Assist  within 7 day(s).  5.  Patient will perform all aspects of toileting with Minimal Assist within 7 day(s).  6.  Patient will participate in upper extremity therapeutic 
Teller within 7 day(s).  3.  Patient will perform upper body dressing with Minimal Assist within 7 day(s).  4.  Patient will perform toilet transfers with Minimal Assist  within 7 day(s).  5.  Patient will perform all aspects of toileting with Minimal Assist within 7 day(s).  6.  Patient will participate in upper extremity therapeutic exercise/activities with Minimal Assist within 7 day(s).    10/22/2024 1315 by Nicol Baez OTA  Outcome: Progressing     Problem: SLP Adult - Impaired Swallowing  Goal: By Discharge: Advance to least restrictive diet without signs or symptoms of aspiration for planned discharge setting.  See evaluation for individualized goals.  Description: Speech Therapy Swallow Goals  Initiated 10/19/2024  -Patient will tolerate IDDSI 6 Soft and Bite-sized Solids / Thin Liquids diet without clinical indicators of aspiration within 7 day(s).        -Patient will tolerate PO trials without clinical indicators of aspiration within 7 day(s).      -Patient will participate in modified barium swallow study within 7 day(s) as indicated. MET      -Patient will demonstrate understanding of swallow safety precautions and aspiration precautions, diet recs with min cues within 7day(s).    -Patient/caregiver goal: To be able to eat more easily and have more of an appetite.            10/22/2024 1058 by Inna Calhoun, SLP  Outcome: Progressing     
provided:   bed mobility , EOB transfers, OOB transfers, amb with AD, seated static and dynamic balance, and standing static and dynamic balance    Functional Measure:  [facility] AM-PAC™ “6 Clicks”         Basic Mobility Inpatient Short Form  How much difficulty does the patient currently have... Unable A Lot A Little None   1.  Turning over in bed (including adjusting bedclothes, sheets and blankets)?   [] 1   [x] 2   [] 3   [] 4   2.  Sitting down on and standing up from a chair with arms ( e.g., wheelchair, bedside commode, etc.)   [] 1   [x] 2   [] 3   [] 4   3.  Moving from lying on back to sitting on the side of the bed?   [] 1   [x] 2   [] 3   [] 4          How much help from another person does the patient currently need... Total A Lot A Little None   4.  Moving to and from a bed to a chair (including a wheelchair)?   [] 1   [x] 2   [] 3   [] 4   5.  Need to walk in hospital room?   [] 1   [x] 2   [] 3   [] 4   6.  Climbing 3-5 steps with a railing?   [] 1   [x] 2   [] 3   [] 4   © , Trustees of [facility], under license to [employer]. All rights reserved     Score:  Initial:  Most Recent: (Date: 10/17/2024 )   Interpretation of Tool:  Represents activities that are increasingly more difficult (i.e. Bed mobility, Transfers, Gait).  Score 24 23 22-20 19-15 14-10 9-7 6   Modifier CH CI CJ CK CL CM CN         Physical Therapy Evaluation Charge Determination   History Examination Presentation Decision-Making   MEDIUM  Complexity : 1-2 comorbidities / personal factors will impact the outcome/ POC  MEDIUM Complexity : 3 Standardized tests and measures addressing body structure, function, activity limitation and / or participation in recreation  LOW Complexity : Stable, uncomplicated  Other outcome measures [facility] 6  MEDIUM      Based on the above components, the patient evaluation is determined to be of the following complexity level: LOW    Pain Ratin/10  pain reported in RLE  Pain

## 2024-10-24 LAB
ABO + RH BLD: NORMAL
BLD PROD TYP BPU: NORMAL
BLOOD BANK BLOOD PRODUCT EXPIRATION DATE: NORMAL
BLOOD BANK CMNT PATIENT-IMP: NORMAL
BLOOD BANK DISPENSE STATUS: NORMAL
BLOOD BANK ISBT PRODUCT BLOOD TYPE: 6200
BLOOD BANK PRODUCT CODE: NORMAL
BLOOD BANK UNIT TYPE AND RH: NORMAL
BLOOD GROUP ANTIBODIES SERPL: NORMAL
BLOOD GROUP ANTIBODIES SERPL: POSITIVE
BPU ID: NORMAL
CROSSMATCH RESULT: NORMAL
DAT POLY-SP REAG RBC QL: NEGATIVE
SPECIMEN EXP DATE BLD: NORMAL
TRANSFUSION STATUS PATIENT QL: NORMAL
UNIT DIVISION: 0
UNIT ISSUE DATE/TIME: NORMAL

## 2024-10-25 ENCOUNTER — FOLLOWUP TELEPHONE ENCOUNTER (OUTPATIENT)
Facility: HOSPITAL | Age: 79
End: 2024-10-25

## 2024-11-11 ENCOUNTER — OFFICE VISIT (OUTPATIENT)
Age: 79
End: 2024-11-11
Payer: MEDICARE

## 2024-11-11 VITALS
HEART RATE: 90 BPM | HEIGHT: 60 IN | OXYGEN SATURATION: 96 % | DIASTOLIC BLOOD PRESSURE: 74 MMHG | TEMPERATURE: 98.1 F | SYSTOLIC BLOOD PRESSURE: 107 MMHG | WEIGHT: 120 LBS | BODY MASS INDEX: 23.56 KG/M2 | RESPIRATION RATE: 16 BRPM

## 2024-11-11 DIAGNOSIS — L97.909 VENOUS ULCER (HCC): Primary | ICD-10-CM

## 2024-11-11 DIAGNOSIS — I83.009 VENOUS ULCER (HCC): Primary | ICD-10-CM

## 2024-11-11 PROCEDURE — G8420 CALC BMI NORM PARAMETERS: HCPCS | Performed by: SURGERY

## 2024-11-11 PROCEDURE — 3074F SYST BP LT 130 MM HG: CPT | Performed by: SURGERY

## 2024-11-11 PROCEDURE — 1090F PRES/ABSN URINE INCON ASSESS: CPT | Performed by: SURGERY

## 2024-11-11 PROCEDURE — 1125F AMNT PAIN NOTED PAIN PRSNT: CPT | Performed by: SURGERY

## 2024-11-11 PROCEDURE — G8484 FLU IMMUNIZE NO ADMIN: HCPCS | Performed by: SURGERY

## 2024-11-11 PROCEDURE — 1123F ACP DISCUSS/DSCN MKR DOCD: CPT | Performed by: SURGERY

## 2024-11-11 PROCEDURE — 1036F TOBACCO NON-USER: CPT | Performed by: SURGERY

## 2024-11-11 PROCEDURE — 1111F DSCHRG MED/CURRENT MED MERGE: CPT | Performed by: SURGERY

## 2024-11-11 PROCEDURE — 3078F DIAST BP <80 MM HG: CPT | Performed by: SURGERY

## 2024-11-11 PROCEDURE — 29581 APPL MULTLAYER CMPRN SYS LEG: CPT | Performed by: SURGERY

## 2024-11-11 PROCEDURE — G8427 DOCREV CUR MEDS BY ELIG CLIN: HCPCS | Performed by: SURGERY

## 2024-11-11 PROCEDURE — G8400 PT W/DXA NO RESULTS DOC: HCPCS | Performed by: SURGERY

## 2024-11-11 PROCEDURE — 99212 OFFICE O/P EST SF 10 MIN: CPT | Performed by: SURGERY

## 2024-11-11 ASSESSMENT — PATIENT HEALTH QUESTIONNAIRE - PHQ9
7. TROUBLE CONCENTRATING ON THINGS, SUCH AS READING THE NEWSPAPER OR WATCHING TELEVISION: NOT AT ALL
8. MOVING OR SPEAKING SO SLOWLY THAT OTHER PEOPLE COULD HAVE NOTICED. OR THE OPPOSITE, BEING SO FIGETY OR RESTLESS THAT YOU HAVE BEEN MOVING AROUND A LOT MORE THAN USUAL: NOT AT ALL
SUM OF ALL RESPONSES TO PHQ QUESTIONS 1-9: 0
10. IF YOU CHECKED OFF ANY PROBLEMS, HOW DIFFICULT HAVE THESE PROBLEMS MADE IT FOR YOU TO DO YOUR WORK, TAKE CARE OF THINGS AT HOME, OR GET ALONG WITH OTHER PEOPLE: NOT DIFFICULT AT ALL
SUM OF ALL RESPONSES TO PHQ QUESTIONS 1-9: 0
2. FEELING DOWN, DEPRESSED OR HOPELESS: NOT AT ALL
1. LITTLE INTEREST OR PLEASURE IN DOING THINGS: NOT AT ALL
4. FEELING TIRED OR HAVING LITTLE ENERGY: NOT AT ALL
5. POOR APPETITE OR OVEREATING: NOT AT ALL
9. THOUGHTS THAT YOU WOULD BE BETTER OFF DEAD, OR OF HURTING YOURSELF: NOT AT ALL
6. FEELING BAD ABOUT YOURSELF - OR THAT YOU ARE A FAILURE OR HAVE LET YOURSELF OR YOUR FAMILY DOWN: NOT AT ALL
3. TROUBLE FALLING OR STAYING ASLEEP: NOT AT ALL
SUM OF ALL RESPONSES TO PHQ9 QUESTIONS 1 & 2: 0
SUM OF ALL RESPONSES TO PHQ QUESTIONS 1-9: 0
SUM OF ALL RESPONSES TO PHQ QUESTIONS 1-9: 0

## 2024-11-11 NOTE — PROGRESS NOTES
Identified pt with two pt identifiers (name and ). Reviewed chart in preparation for visit and have obtained necessary documentation.    Brenda Olivas is a 79 y.o. female  Chief Complaint   Patient presents with    Follow-up     Hospital follow up      /74 (Site: Left Upper Arm, Position: Sitting, Cuff Size: Medium Adult)   Pulse 90   Temp 98.1 °F (36.7 °C) (Oral)   Resp 16   Ht 1.524 m (5')   Wt 54.4 kg (120 lb)   SpO2 96%   BMI 23.44 kg/m²     1. Have you been to the ER, urgent care clinic since your last visit?  Hospitalized since your last visit?no    2. Have you seen or consulted any other health care providers outside of the [facility] since your last visit?  Include any pap smears or colon screening. no

## 2024-11-12 ENCOUNTER — TELEPHONE (OUTPATIENT)
Age: 79
End: 2024-11-12

## 2024-11-12 NOTE — TELEPHONE ENCOUNTER
Maggie Kelley called from [facility] said she was told to call  and give information on the patient her call back number is [phone].

## 2024-11-14 NOTE — TELEPHONE ENCOUNTER
I called and spoke with Maggie at [facility] who states the patient was seen on 11/12/2024 by [facility] but not sure exactly what the patient needs. I sent a perfect serve message to  to see exactly what he needs.

## 2024-11-14 NOTE — TELEPHONE ENCOUNTER
Per      Cover with xeroform gauze and wrap leg from foot to below knee level with kerlix and ace wrap Change ever other day     I called and spoke with Maggie and gave her orders per .

## 2024-11-18 PROBLEM — I83.009 VENOUS ULCER (HCC): Status: ACTIVE | Noted: 2024-11-18

## 2024-11-18 PROBLEM — L97.909 VENOUS ULCER (HCC): Status: ACTIVE | Noted: 2024-11-18

## 2024-11-18 NOTE — PROGRESS NOTES
Vascular History and Physical    Patient: Brenda Olivas  MRN: [id number]    YOB: 1945  Age: 79 y.o.  Sex: female     Chief Complaint:  Chief Complaint   Patient presents with    Follow-up     Hospital follow up        History of Present Illness: Brenda Olivas is a 79 y.o. very pleasant woman is here today with reexamine venous ulcer she has on the right leg.  Patient denies fever chills.    Social History:  Social Connections: Unknown (2022)    Received from [facility] [employer] [facility]    Social Connection and Isolation Panel [NHANES]     Frequency of Communication with Friends and Family: More than three times a week     Frequency of Social Gatherings with Friends and Family: More than three times a week     Attends Congregational Services: Not on file     Active Member of Clubs or Organizations: Not on file     Attends Club or Organization Meetings: Not on file     Marital Status:        Past Medical History:  Past Medical History:   Diagnosis Date    Anxiety     Arthritis     Asthma     CAD (coronary artery disease)     Cancer (HCC)     multiple myeloma, currenly getting chemo po    Depression     Hypertension     Multiple myeloma (HCC)        Surgical History:  Past Surgical History:   Procedure Laterality Date     SECTION N/A     GASTRIC BYPASS SURGERY N/A     HYSTEROTOMY N/A     uterus removed    JOINT REPLACEMENT Right 2019    hip    ROTATOR CUFF REPAIR Left     TOTAL KNEE ARTHROPLASTY Right 2010    UPPER GASTROINTESTINAL ENDOSCOPY N/A 2024    EGD BIOPSY performed by Tulio Gamboa MD at [facility] ENDOSCOPY    UPPER GASTROINTESTINAL ENDOSCOPY N/A 10/18/2024    ESOPHAGOGASTRODUODENOSCOPY performed by Jakub Baxter MD at [facility] ENDOSCOPY       Allergies:  No Known Allergies    Current Meds:  Current Outpatient Medications   Medication Sig Dispense Refill    calcium carbonate (TUMS) 500 MG chewable tablet Take 2 tablets by mouth in the morning and 2 tablets in the evening. 120

## 2024-12-02 ENCOUNTER — OFFICE VISIT (OUTPATIENT)
Age: 79
End: 2024-12-02

## 2024-12-02 VITALS
DIASTOLIC BLOOD PRESSURE: 81 MMHG | BODY MASS INDEX: 23.44 KG/M2 | SYSTOLIC BLOOD PRESSURE: 125 MMHG | HEIGHT: 60 IN | OXYGEN SATURATION: 96 % | RESPIRATION RATE: 18 BRPM | TEMPERATURE: 97.7 F | HEART RATE: 89 BPM

## 2024-12-02 DIAGNOSIS — L97.909 VENOUS ULCER (HCC): Primary | ICD-10-CM

## 2024-12-02 DIAGNOSIS — I83.009 VENOUS ULCER (HCC): Primary | ICD-10-CM

## 2024-12-02 ASSESSMENT — PATIENT HEALTH QUESTIONNAIRE - PHQ9
SUM OF ALL RESPONSES TO PHQ QUESTIONS 1-9: 0
1. LITTLE INTEREST OR PLEASURE IN DOING THINGS: NOT AT ALL
2. FEELING DOWN, DEPRESSED OR HOPELESS: NOT AT ALL
SUM OF ALL RESPONSES TO PHQ9 QUESTIONS 1 & 2: 0
SUM OF ALL RESPONSES TO PHQ QUESTIONS 1-9: 0

## 2024-12-02 NOTE — PROGRESS NOTES
Identified pt with two pt identifiers (name and ). Reviewed chart in preparation for visit and have obtained necessary documentation.    Brenda Olivas is a 79 y.o. female  Chief Complaint   Patient presents with    Follow-up     right leg     /81 (Site: Left Upper Arm, Position: Sitting, Cuff Size: Small Adult)   Pulse 89   Temp 97.7 °F (36.5 °C) (Oral)   Resp 18   Ht 1.524 m (5')   SpO2 96%   BMI 23.44 kg/m²     1. Have you been to the ER, urgent care clinic since your last visit?  Hospitalized since your last visit? NO    2. Have you seen or consulted any other health care providers outside of the [facility] since your last visit?  Include any pap smears or colon screening. NO

## 2024-12-16 ENCOUNTER — OFFICE VISIT (OUTPATIENT)
Age: 79
End: 2024-12-16
Payer: MEDICARE

## 2024-12-16 VITALS
OXYGEN SATURATION: 97 % | BODY MASS INDEX: 25 KG/M2 | DIASTOLIC BLOOD PRESSURE: 82 MMHG | RESPIRATION RATE: 16 BRPM | WEIGHT: 128 LBS | SYSTOLIC BLOOD PRESSURE: 128 MMHG | HEART RATE: 81 BPM | TEMPERATURE: 97.7 F

## 2024-12-16 DIAGNOSIS — L97.909 VENOUS ULCER (HCC): Primary | ICD-10-CM

## 2024-12-16 DIAGNOSIS — I83.009 VENOUS ULCER (HCC): Primary | ICD-10-CM

## 2024-12-16 PROCEDURE — G8419 CALC BMI OUT NRM PARAM NOF/U: HCPCS | Performed by: SURGERY

## 2024-12-16 PROCEDURE — 1123F ACP DISCUSS/DSCN MKR DOCD: CPT | Performed by: SURGERY

## 2024-12-16 PROCEDURE — G8484 FLU IMMUNIZE NO ADMIN: HCPCS | Performed by: SURGERY

## 2024-12-16 PROCEDURE — 3074F SYST BP LT 130 MM HG: CPT | Performed by: SURGERY

## 2024-12-16 PROCEDURE — 1090F PRES/ABSN URINE INCON ASSESS: CPT | Performed by: SURGERY

## 2024-12-16 PROCEDURE — 1126F AMNT PAIN NOTED NONE PRSNT: CPT | Performed by: SURGERY

## 2024-12-16 PROCEDURE — 1036F TOBACCO NON-USER: CPT | Performed by: SURGERY

## 2024-12-16 PROCEDURE — 99212 OFFICE O/P EST SF 10 MIN: CPT | Performed by: SURGERY

## 2024-12-16 PROCEDURE — G8400 PT W/DXA NO RESULTS DOC: HCPCS | Performed by: SURGERY

## 2024-12-16 PROCEDURE — G8427 DOCREV CUR MEDS BY ELIG CLIN: HCPCS | Performed by: SURGERY

## 2024-12-16 PROCEDURE — 29581 APPL MULTLAYER CMPRN SYS LEG: CPT | Performed by: SURGERY

## 2024-12-16 PROCEDURE — 3079F DIAST BP 80-89 MM HG: CPT | Performed by: SURGERY

## 2024-12-16 ASSESSMENT — PATIENT HEALTH QUESTIONNAIRE - PHQ9
SUM OF ALL RESPONSES TO PHQ QUESTIONS 1-9: 0
SUM OF ALL RESPONSES TO PHQ QUESTIONS 1-9: 0
1. LITTLE INTEREST OR PLEASURE IN DOING THINGS: NOT AT ALL
2. FEELING DOWN, DEPRESSED OR HOPELESS: NOT AT ALL
SUM OF ALL RESPONSES TO PHQ QUESTIONS 1-9: 0
SUM OF ALL RESPONSES TO PHQ9 QUESTIONS 1 & 2: 0
SUM OF ALL RESPONSES TO PHQ QUESTIONS 1-9: 0

## 2024-12-16 NOTE — PROGRESS NOTES
Identified pt with two pt identifiers (name and ). Reviewed chart in preparation for visit and have obtained necessary documentation.    Brenda Olivas is a 79 y.o. female  Chief Complaint   Patient presents with    Post-Op Check     /82 (Site: Right Upper Arm, Position: Sitting, Cuff Size: Large Adult)   Pulse 81   Temp 97.7 °F (36.5 °C) (Oral)   Resp 16   Wt 58.1 kg (128 lb)   SpO2 97%   BMI 25.00 kg/m²     1. Have you been to the ER, urgent care clinic since your last visit?  Hospitalized since your last visit?NO    2. Have you seen or consulted any other health care providers outside of the [facility] since your last visit?  Include any pap smears or colon screening. NO

## 2024-12-17 ENCOUNTER — OFFICE VISIT (OUTPATIENT)
Age: 79
End: 2024-12-17
Payer: MEDICARE

## 2024-12-17 VITALS
RESPIRATION RATE: 16 BRPM | OXYGEN SATURATION: 98 % | DIASTOLIC BLOOD PRESSURE: 79 MMHG | HEIGHT: 60 IN | WEIGHT: 126 LBS | TEMPERATURE: 98 F | HEART RATE: 78 BPM | BODY MASS INDEX: 24.74 KG/M2 | SYSTOLIC BLOOD PRESSURE: 132 MMHG

## 2024-12-17 DIAGNOSIS — L08.9 INFECTION OF ANTERIOR LOWER LEG: Primary | ICD-10-CM

## 2024-12-17 PROCEDURE — 1123F ACP DISCUSS/DSCN MKR DOCD: CPT | Performed by: INTERNAL MEDICINE

## 2024-12-17 PROCEDURE — 3078F DIAST BP <80 MM HG: CPT | Performed by: INTERNAL MEDICINE

## 2024-12-17 PROCEDURE — 3075F SYST BP GE 130 - 139MM HG: CPT | Performed by: INTERNAL MEDICINE

## 2024-12-17 PROCEDURE — G8484 FLU IMMUNIZE NO ADMIN: HCPCS | Performed by: INTERNAL MEDICINE

## 2024-12-17 PROCEDURE — 99213 OFFICE O/P EST LOW 20 MIN: CPT | Performed by: INTERNAL MEDICINE

## 2024-12-17 PROCEDURE — G8427 DOCREV CUR MEDS BY ELIG CLIN: HCPCS | Performed by: INTERNAL MEDICINE

## 2024-12-17 PROCEDURE — 1090F PRES/ABSN URINE INCON ASSESS: CPT | Performed by: INTERNAL MEDICINE

## 2024-12-17 PROCEDURE — G8420 CALC BMI NORM PARAMETERS: HCPCS | Performed by: INTERNAL MEDICINE

## 2024-12-17 PROCEDURE — 1036F TOBACCO NON-USER: CPT | Performed by: INTERNAL MEDICINE

## 2024-12-17 PROCEDURE — 1126F AMNT PAIN NOTED NONE PRSNT: CPT | Performed by: INTERNAL MEDICINE

## 2024-12-17 PROCEDURE — G8400 PT W/DXA NO RESULTS DOC: HCPCS | Performed by: INTERNAL MEDICINE

## 2024-12-17 NOTE — PROGRESS NOTES
\"Have you been to the ER, urgent care clinic since your last visit?  Hospitalized since your last visit?\"    YES - When: approximately 10/2024 ago.  Where and Why: [facility] for wound infection.    “Have you seen or consulted any other health care providers outside our system since your last visit?”    YES - When: approximately 12/2024 ago.  Where and Why: [facility] for cancer folllow up and [facility] for follow up.    Chief Complaint   Patient presents with    New Patient    Follow-Up from Hospital    Wound Infection     /79 (Site: Left Upper Arm, Position: Sitting, Cuff Size: Medium Adult)   Pulse 78   Temp 98 °F (36.7 °C) (Temporal)   Resp 16   Ht 1.524 m (5')   Wt 57.2 kg (126 lb)   SpO2 98%   BMI 24.61 kg/m²

## 2024-12-18 NOTE — PROGRESS NOTES
Vascular History and Physical    Patient: Brenda Olivas  MRN: [id number]    YOB: 1945  Age: 79 y.o.  Sex: female     Chief Complaint:  Chief Complaint   Patient presents with    Post-Op Check       History of Present Illness: Brenda Olivas is a 79 y.o. very pleasant woman is here today follow-up recheck venous ulcer on the right leg.  Patient currently doing wound care with Xeroform gauze with modified compression wrap right leg.    Patient denies any fever or chills.  Pain is minimal.  Left leg has no issues other than mild swelling.    Social History:  Social Connections: Unknown (2022)    Received from [facility] [employer], [facility]    Social Connection and Isolation Panel [NHANES]     Frequency of Communication with Friends and Family: More than three times a week     Frequency of Social Gatherings with Friends and Family: More than three times a week     Attends Lutheran Services: Not on file     Active Member of Clubs or Organizations: Not on file     Attends Club or Organization Meetings: Not on file     Marital Status:        Past Medical History:  Past Medical History:   Diagnosis Date    Anxiety     Arthritis     Asthma     CAD (coronary artery disease)     Cancer (HCC)     multiple myeloma, currenly getting chemo po    Depression     Hypertension     Multiple myeloma (HCC)        Surgical History:  Past Surgical History:   Procedure Laterality Date     SECTION N/A     GASTRIC BYPASS SURGERY N/A     HYSTEROTOMY N/A     uterus removed    JOINT REPLACEMENT Right     hip    ROTATOR CUFF REPAIR Left     TOTAL KNEE ARTHROPLASTY Right     UPPER GASTROINTESTINAL ENDOSCOPY N/A 2024    EGD BIOPSY performed by Tulio Gamboa MD at [facility] ENDOSCOPY    UPPER GASTROINTESTINAL ENDOSCOPY N/A 10/18/2024    ESOPHAGOGASTRODUODENOSCOPY performed by Jakub Baxter MD at [facility] ENDOSCOPY       Allergies:  Allergies   Allergen Reactions    Adhesive Tape Itching       Current

## 2024-12-21 NOTE — PROGRESS NOTES
Subjective    Brenda Olivas is a 79 y.o. 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  Since then she has been followed closely by Vascular Surgery, last seen on 2024 at which time wound was felt to be progressing favorably with plans for skin dermal substitute. Patient is here for routine follow-up.    Review of Systems   Constitutional:  Negative for chills and fever.   Cardiovascular:  Positive for leg swelling.   Skin:  Positive for wound. Negative for rash.   Hematological:  Negative for adenopathy.         Past Medical History:   Diagnosis Date    Anxiety     Arthritis     Asthma     CAD (coronary artery disease)     Cancer (HCC)     multiple myeloma, currenly getting chemo po    Depression     Hypertension     Multiple myeloma (HCC)         Past Surgical History:   Procedure Laterality Date     SECTION N/A     GASTRIC BYPASS SURGERY N/A     HYSTEROTOMY N/A     uterus removed    JOINT REPLACEMENT Right 2019    hip    ROTATOR CUFF REPAIR Left     TOTAL KNEE ARTHROPLASTY Right     UPPER GASTROINTESTINAL ENDOSCOPY N/A 2024    EGD BIOPSY performed by Tuloi Gamboa MD at [facility] ENDOSCOPY    UPPER GASTROINTESTINAL ENDOSCOPY N/A 10/18/2024    ESOPHAGOGASTRODUODENOSCOPY performed by Jakub Baxter MD at [facility] ENDOSCOPY        Vitals:    24 1058   BP: 132/79   Site: Left Upper Arm   Position: Sitting   Cuff Size: Medium Adult   Pulse: 78   Resp: 16   Temp: 98 °F (36.7 °C)   TempSrc: Temporal   SpO2: 98%   Weight: 57.2 kg (126 lb)   Height: 1.524 m (5')       Objective  Physical Exam  Vitals and nursing note reviewed.   Constitutional:       Appearance: She is not ill-appearing.   Musculoskeletal:      Right lower leg: Edema present.      Left lower leg: No edema.        Legs:       Comments: Superficial wound right anterior shin, normal granulation tissue, no exudate   Skin:

## 2025-01-06 ENCOUNTER — OFFICE VISIT (OUTPATIENT)
Age: 80
End: 2025-01-06

## 2025-01-06 VITALS
OXYGEN SATURATION: 96 % | SYSTOLIC BLOOD PRESSURE: 120 MMHG | BODY MASS INDEX: 24.74 KG/M2 | TEMPERATURE: 98.2 F | RESPIRATION RATE: 16 BRPM | HEIGHT: 60 IN | WEIGHT: 126 LBS | HEART RATE: 69 BPM | DIASTOLIC BLOOD PRESSURE: 72 MMHG

## 2025-01-06 DIAGNOSIS — L97.909 VENOUS ULCER (HCC): Primary | ICD-10-CM

## 2025-01-06 DIAGNOSIS — I83.009 VENOUS ULCER (HCC): Primary | ICD-10-CM

## 2025-01-06 ASSESSMENT — PATIENT HEALTH QUESTIONNAIRE - PHQ9
2. FEELING DOWN, DEPRESSED OR HOPELESS: NOT AT ALL
SUM OF ALL RESPONSES TO PHQ QUESTIONS 1-9: 0
1. LITTLE INTEREST OR PLEASURE IN DOING THINGS: NOT AT ALL
SUM OF ALL RESPONSES TO PHQ9 QUESTIONS 1 & 2: 0
SUM OF ALL RESPONSES TO PHQ QUESTIONS 1-9: 0

## 2025-01-06 NOTE — PROGRESS NOTES
Identified pt with two pt identifiers (name and ). Reviewed chart in preparation for visit and have obtained necessary documentation.    Brenda Olivas is a 79 y.o. female  Chief Complaint   Patient presents with    Follow-up    Wound Check     /72 (Site: Right Upper Arm, Position: Sitting, Cuff Size: Large Adult)   Pulse 69   Temp 98.2 °F (36.8 °C) (Oral)   Resp 16   Ht 1.524 m (5')   Wt 57.2 kg (126 lb)   SpO2 96%   BMI 24.61 kg/m²     1. Have you been to the ER, urgent care clinic since your last visit?  Hospitalized since your last visit?NO    2. Have you seen or consulted any other health care providers outside of the [facility] since your last visit?  Include any pap smears or colon screening. NO     [id number]

## 2025-01-10 ENCOUNTER — OFFICE VISIT (OUTPATIENT)
Age: 80
End: 2025-01-10

## 2025-01-10 VITALS
OXYGEN SATURATION: 97 % | RESPIRATION RATE: 16 BRPM | HEIGHT: 60 IN | TEMPERATURE: 98.2 F | BODY MASS INDEX: 24.94 KG/M2 | DIASTOLIC BLOOD PRESSURE: 82 MMHG | SYSTOLIC BLOOD PRESSURE: 140 MMHG | HEART RATE: 92 BPM | WEIGHT: 127 LBS

## 2025-01-10 DIAGNOSIS — L97.909 VENOUS ULCER (HCC): Primary | ICD-10-CM

## 2025-01-10 DIAGNOSIS — I83.009 VENOUS ULCER (HCC): Primary | ICD-10-CM

## 2025-01-10 ASSESSMENT — PATIENT HEALTH QUESTIONNAIRE - PHQ9
1. LITTLE INTEREST OR PLEASURE IN DOING THINGS: NOT AT ALL
SUM OF ALL RESPONSES TO PHQ QUESTIONS 1-9: 0
SUM OF ALL RESPONSES TO PHQ QUESTIONS 1-9: 0
SUM OF ALL RESPONSES TO PHQ9 QUESTIONS 1 & 2: 0
2. FEELING DOWN, DEPRESSED OR HOPELESS: NOT AT ALL
SUM OF ALL RESPONSES TO PHQ QUESTIONS 1-9: 0
SUM OF ALL RESPONSES TO PHQ QUESTIONS 1-9: 0

## 2025-01-10 NOTE — PROGRESS NOTES
Identified pt with two pt identifiers (name and ). Reviewed chart in preparation for visit and have obtained necessary documentation.    Brenda Olivas is a 79 y.o. female  Chief Complaint   Patient presents with    Follow-up     Graft placement      BP (!) 140/82 (Site: Left Upper Arm, Position: Sitting, Cuff Size: Large Adult)   Pulse 92   Temp 98.2 °F (36.8 °C) (Oral)   Resp 16   Ht 1.524 m (5')   Wt 57.6 kg (127 lb)   SpO2 97%   BMI 24.80 kg/m²     1. Have you been to the ER, urgent care clinic since your last visit?  Hospitalized since your last visit?NO    2. Have you seen or consulted any other health care providers outside of the [facility] since your last visit?  Include any pap smears or colon screening. NO

## 2025-01-13 NOTE — PROGRESS NOTES
Vascular History and Physical    Patient: Brenda Olivas  MRN: [id number]    YOB: 1945  Age: 79 y.o.  Sex: female     Chief Complaint:  Chief Complaint   Patient presents with    Follow-up    Wound Check       History of Present Illness: Brenda Olivas is a 79 y.o. very pleasant woman is here today follow-up wound check on the venous ulcer she has right leg.  Patient has a at least 5 x 8 cm venous ulcer on the left leg.  We have been trying modified compression wraps on the leg without great success.    Social History:  Social Connections: Unknown (2022)    Received from [facility] [employer], [facility]    Social Connection and Isolation Panel [NHANES]     Frequency of Communication with Friends and Family: More than three times a week     Frequency of Social Gatherings with Friends and Family: More than three times a week     Attends Pentecostal Services: Not on file     Active Member of Clubs or Organizations: Not on file     Attends Club or Organization Meetings: Not on file     Marital Status:        Past Medical History:  Past Medical History:   Diagnosis Date    Anxiety     Arthritis     Asthma     CAD (coronary artery disease)     Cancer (HCC)     multiple myeloma, currenly getting chemo po    Depression     Hypertension     Multiple myeloma (HCC)        Surgical History:  Past Surgical History:   Procedure Laterality Date     SECTION N/A     GASTRIC BYPASS SURGERY N/A     HYSTEROTOMY N/A     uterus removed    JOINT REPLACEMENT Right 2019    hip    ROTATOR CUFF REPAIR Left     TOTAL KNEE ARTHROPLASTY Right     UPPER GASTROINTESTINAL ENDOSCOPY N/A 2024    EGD BIOPSY performed by Tulio Gamboa MD at [facility] ENDOSCOPY    UPPER GASTROINTESTINAL ENDOSCOPY N/A 10/18/2024    ESOPHAGOGASTRODUODENOSCOPY performed by Jakub Baxter MD at [facility] ENDOSCOPY       Allergies:  Allergies   Allergen Reactions    Adhesive Tape Itching       Current Meds:  Current Outpatient Medications

## 2025-01-17 ENCOUNTER — OFFICE VISIT (OUTPATIENT)
Age: 80
End: 2025-01-17

## 2025-01-17 VITALS
BODY MASS INDEX: 24.35 KG/M2 | WEIGHT: 124 LBS | RESPIRATION RATE: 18 BRPM | HEART RATE: 82 BPM | OXYGEN SATURATION: 97 % | SYSTOLIC BLOOD PRESSURE: 144 MMHG | TEMPERATURE: 98.4 F | HEIGHT: 60 IN | DIASTOLIC BLOOD PRESSURE: 83 MMHG

## 2025-01-17 DIAGNOSIS — L97.909 VENOUS ULCER (HCC): Primary | ICD-10-CM

## 2025-01-17 DIAGNOSIS — I83.009 VENOUS ULCER (HCC): Primary | ICD-10-CM

## 2025-01-17 ASSESSMENT — PATIENT HEALTH QUESTIONNAIRE - PHQ9
8. MOVING OR SPEAKING SO SLOWLY THAT OTHER PEOPLE COULD HAVE NOTICED. OR THE OPPOSITE, BEING SO FIGETY OR RESTLESS THAT YOU HAVE BEEN MOVING AROUND A LOT MORE THAN USUAL: SEVERAL DAYS
6. FEELING BAD ABOUT YOURSELF - OR THAT YOU ARE A FAILURE OR HAVE LET YOURSELF OR YOUR FAMILY DOWN: NOT AT ALL
SUM OF ALL RESPONSES TO PHQ QUESTIONS 1-9: 7
9. THOUGHTS THAT YOU WOULD BE BETTER OFF DEAD, OR OF HURTING YOURSELF: NOT AT ALL
2. FEELING DOWN, DEPRESSED OR HOPELESS: SEVERAL DAYS
4. FEELING TIRED OR HAVING LITTLE ENERGY: NEARLY EVERY DAY
SUM OF ALL RESPONSES TO PHQ QUESTIONS 1-9: 7
SUM OF ALL RESPONSES TO PHQ QUESTIONS 1-9: 7
7. TROUBLE CONCENTRATING ON THINGS, SUCH AS READING THE NEWSPAPER OR WATCHING TELEVISION: SEVERAL DAYS
10. IF YOU CHECKED OFF ANY PROBLEMS, HOW DIFFICULT HAVE THESE PROBLEMS MADE IT FOR YOU TO DO YOUR WORK, TAKE CARE OF THINGS AT HOME, OR GET ALONG WITH OTHER PEOPLE: SOMEWHAT DIFFICULT
SUM OF ALL RESPONSES TO PHQ9 QUESTIONS 1 & 2: 2
3. TROUBLE FALLING OR STAYING ASLEEP: NOT AT ALL
SUM OF ALL RESPONSES TO PHQ QUESTIONS 1-9: 7
1. LITTLE INTEREST OR PLEASURE IN DOING THINGS: SEVERAL DAYS
5. POOR APPETITE OR OVEREATING: NOT AT ALL

## 2025-01-17 NOTE — PROGRESS NOTES
Identified patient with two patient identifiers (name and ). Reviewed chart in preparation for visit and have obtained necessary documentation.    Brenda Olivsa is a 79 y.o. female  Chief Complaint   Patient presents with    Follow-up     Graft replacement     BP (!) 153/85 (Site: Right Upper Arm, Position: Sitting, Cuff Size: Small Adult)   Pulse 82   Temp 98.4 °F (36.9 °C) (Oral)   Resp 18   Ht 1.524 m (5')   Wt 56.2 kg (124 lb)   SpO2 97%   BMI 24.22 kg/m²     1. Have you been to the ER, urgent care clinic since your last visit?  Hospitalized since your last visit?no    2. Have you seen or consulted any other health care providers outside of the [facility] since your last visit?  Include any pap smears or colon screening. yes   Patient and provider made aware of elevated BP x2. Patient asymptomatic. Patient reminded to monitor BP, continue to take BP medications if prescribed, and follow up with PCP/Cardiologist.  Patient expressed understanding and agreement.

## 2025-01-23 ENCOUNTER — OFFICE VISIT (OUTPATIENT)
Age: 80
End: 2025-01-23

## 2025-01-23 VITALS
WEIGHT: 123.5 LBS | DIASTOLIC BLOOD PRESSURE: 87 MMHG | TEMPERATURE: 97.5 F | HEART RATE: 67 BPM | BODY MASS INDEX: 24.25 KG/M2 | HEIGHT: 60 IN | SYSTOLIC BLOOD PRESSURE: 140 MMHG | OXYGEN SATURATION: 96 %

## 2025-01-23 DIAGNOSIS — I83.009 VENOUS ULCER (HCC): Primary | ICD-10-CM

## 2025-01-23 DIAGNOSIS — L97.909 VENOUS ULCER (HCC): Primary | ICD-10-CM

## 2025-01-23 ASSESSMENT — PATIENT HEALTH QUESTIONNAIRE - PHQ9
2. FEELING DOWN, DEPRESSED OR HOPELESS: NOT AT ALL
SUM OF ALL RESPONSES TO PHQ QUESTIONS 1-9: 0
SUM OF ALL RESPONSES TO PHQ QUESTIONS 1-9: 0
SUM OF ALL RESPONSES TO PHQ9 QUESTIONS 1 & 2: 0
SUM OF ALL RESPONSES TO PHQ QUESTIONS 1-9: 0
SUM OF ALL RESPONSES TO PHQ QUESTIONS 1-9: 0
1. LITTLE INTEREST OR PLEASURE IN DOING THINGS: NOT AT ALL

## 2025-01-23 NOTE — PROGRESS NOTES
Identified pt with two pt identifiers (name and ). Reviewed chart in preparation for visit and have obtained necessary documentation.    Brenda Olivas is a 79 y.o. female  Chief Complaint   Patient presents with    Follow-up     Follow up Graft     BP (!) 140/87 (Site: Left Upper Arm, Position: Sitting, Cuff Size: Small Adult)   Pulse 67   Temp 97.5 °F (36.4 °C) (Temporal)   Ht 1.524 m (5')   Wt 56 kg (123 lb 8 oz)   SpO2 96%   BMI 24.12 kg/m²     1. Have you been to the ER, urgent care clinic since your last visit?  Hospitalized since your last visit?no    2. Have you seen or consulted any other health care providers outside of the [facility] since your last visit?  Include any pap smears or colon screening. no     This was the second blood pressure check.

## 2025-01-30 NOTE — PROGRESS NOTES
Vascular History and Physical    Patient: Brenda Olivas  MRN: [id number]    YOB: 1945  Age: 79 y.o.  Sex: female     Chief Complaint:  Chief Complaint   Patient presents with    Follow-up     Follow up Graft       History of Present Illness: Brenda Olivas is a 79 y.o. very pleasant woman is here today follow-up exam venous ulcer on the right leg.  Last week I applied a Grafix graft.  Patient is no fever or chills.    Social History:  Social Connections: Unknown (2022)    Received from [facility], [facility]    Social Connection and Isolation Panel [NHANES]     Frequency of Communication with Friends and Family: More than three times a week     Frequency of Social Gatherings with Friends and Family: More than three times a week     Attends Jehovah's witness Services: Not on file     Active Member of Clubs or Organizations: Not on file     Attends Club or Organization Meetings: Not on file     Marital Status:        Past Medical History:  Past Medical History:   Diagnosis Date    Anxiety     Arthritis     Asthma     CAD (coronary artery disease)     Cancer (HCC)     multiple myeloma, currenly getting chemo po    Depression     Hypertension     Multiple myeloma (HCC)        Surgical History:  Past Surgical History:   Procedure Laterality Date     SECTION N/A     GASTRIC BYPASS SURGERY N/A     HYSTEROTOMY N/A     uterus removed    JOINT REPLACEMENT Right 2019    hip    ROTATOR CUFF REPAIR Left     TOTAL KNEE ARTHROPLASTY Right 2010    UPPER GASTROINTESTINAL ENDOSCOPY N/A 2024    EGD BIOPSY performed by Tulio Gamboa MD at [facility] ENDOSCOPY    UPPER GASTROINTESTINAL ENDOSCOPY N/A 10/18/2024    ESOPHAGOGASTRODUODENOSCOPY performed by Jakub Baxter MD at [facility] ENDOSCOPY       Allergies:  Allergies   Allergen Reactions    Adhesive Tape Itching       Current Meds:  Current Outpatient Medications   Medication Sig Dispense Refill    mirtazapine (REMERON) 15 MG tablet Take 1 tablet by mouth

## 2025-01-31 NOTE — PROGRESS NOTES
Vascular History and Physical    Patient: Brenda Olivas  MRN: [id number]    YOB: 1945  Age: 79 y.o.  Sex: female     Chief Complaint:  Chief Complaint   Patient presents with    Follow-up     Graft placement        History of Present Illness: Brenda Olivas is a 79 y.o. very pleasant woman is here today recheck venous ulcer right leg.  Last week we applied a Grafix graft.  And wound care was done with modified compression wraps every other day.  No fever or chills.  Pain is minimal.    Social History:  Social Connections: Unknown (2022)    Received from [facility] [employer], [facility]    Social Connection and Isolation Panel [NHANES]     Frequency of Communication with Friends and Family: More than three times a week     Frequency of Social Gatherings with Friends and Family: More than three times a week     Attends Caodaism Services: Not on file     Active Member of Clubs or Organizations: Not on file     Attends Club or Organization Meetings: Not on file     Marital Status:        Past Medical History:  Past Medical History:   Diagnosis Date    Anxiety     Arthritis     Asthma     CAD (coronary artery disease)     Cancer (HCC)     multiple myeloma, currenly getting chemo po    Depression     Hypertension     Multiple myeloma (HCC)        Surgical History:  Past Surgical History:   Procedure Laterality Date     SECTION N/A     GASTRIC BYPASS SURGERY N/A     HYSTEROTOMY N/A     uterus removed    JOINT REPLACEMENT Right 2019    hip    ROTATOR CUFF REPAIR Left     TOTAL KNEE ARTHROPLASTY Right     UPPER GASTROINTESTINAL ENDOSCOPY N/A 2024    EGD BIOPSY performed by Tulio Gamboa MD at [facility] ENDOSCOPY    UPPER GASTROINTESTINAL ENDOSCOPY N/A 10/18/2024    ESOPHAGOGASTRODUODENOSCOPY performed by Jakub Baxter MD at [facility] ENDOSCOPY       Allergies:  Allergies   Allergen Reactions    Adhesive Tape Itching       Current Meds:  Current Outpatient Medications   Medication Sig

## 2025-02-03 ENCOUNTER — OFFICE VISIT (OUTPATIENT)
Age: 80
End: 2025-02-03

## 2025-02-03 VITALS
DIASTOLIC BLOOD PRESSURE: 81 MMHG | SYSTOLIC BLOOD PRESSURE: 138 MMHG | RESPIRATION RATE: 14 BRPM | OXYGEN SATURATION: 96 % | WEIGHT: 126.5 LBS | BODY MASS INDEX: 24.84 KG/M2 | TEMPERATURE: 97.7 F | HEIGHT: 60 IN | HEART RATE: 78 BPM

## 2025-02-03 DIAGNOSIS — I83.009 VENOUS ULCER (HCC): Primary | ICD-10-CM

## 2025-02-03 DIAGNOSIS — L97.909 VENOUS ULCER (HCC): Primary | ICD-10-CM

## 2025-02-03 ASSESSMENT — PATIENT HEALTH QUESTIONNAIRE - PHQ9
SUM OF ALL RESPONSES TO PHQ QUESTIONS 1-9: 2
2. FEELING DOWN, DEPRESSED OR HOPELESS: SEVERAL DAYS
SUM OF ALL RESPONSES TO PHQ9 QUESTIONS 1 & 2: 2
1. LITTLE INTEREST OR PLEASURE IN DOING THINGS: SEVERAL DAYS
SUM OF ALL RESPONSES TO PHQ QUESTIONS 1-9: 2

## 2025-02-08 NOTE — PROGRESS NOTES
Identified pt with two pt identifiers (name and ). Reviewed chart in preparation for visit and have obtained necessary documentation.    Brenda Olivas is a 79 y.o. female  Chief Complaint   Patient presents with    Follow-up    Wound Check     Skin graft      /81 (Site: Right Upper Arm, Position: Sitting, Cuff Size: Large Adult)   Pulse 78   Temp 97.7 °F (36.5 °C) (Oral)   Resp 14   Ht 1.524 m (5')   Wt 57.4 kg (126 lb 8 oz)   SpO2 96%   BMI 24.71 kg/m²     1. Have you been to the ER, urgent care clinic since your last visit?  Hospitalized since your last visit?NO    2. Have you seen or consulted any other health care providers outside of the [facility] since your last visit?  Include any pap smears or colon screening. yes    
  SpO2 96%   BMI 24.71 kg/m²   Gen: Well developed, well nourished 79 y.o. female in no acute distress  Head: normocephalic, atraumatic  Mouth: Clear, no overt lesions, oral mucosa pink and moist  Neck: supple, no masses, no adenopathy or carotid bruits, trachea midline  Resp: clear to auscultation bilaterally, no wheeze, rhonchi or rales, excursions normal and symmetrical  Cardio: Regular rate and rhythm, no murmurs, clicks, gallops or rubs, no edema or varicosities  Abdomen: soft, nontender, nondistended, normoactive bowel sounds, no hernias, no hepatosplenomegaly   Neuro: sensation and strength grossly intact and symmetrical  Psych: alert and oriented to person, place and time  Vascular examination: Wounds are healing well.  Currently measuring 4 x 4 cm no drainage.  New Grafix graft was applied.  Skin: warm and moist.    Labs:  No visits with results within 1 Month(s) from this visit.   Latest known visit with results is:   No results displayed because visit has over 200 results.            Images:      Kemar Beyer MD    Assessments:  Patient Active Problem List   Diagnosis    CHF (congestive heart failure), NYHA class I, acute on chronic, combined (HCC)    Anemia    CKD (chronic kidney disease)    HTN (hypertension)    Depression    ASHANTI (acute kidney injury) (HCC)    Venous ulcer (HCC)       Plans:   Patient doing well.  Wound appears to shrunk another 20%.  Currently wounds measuring 4 x 4 cm.  New Grafix graft was applied.  Patient tolerated procedure well.  Patient's  was explained wound care and modified compression wraps to be changed every other day.  Patient will be reassessed next week.     Procedure:  Patient's right leg venous wound measures to be about 4.0 x 4.0 cm.  Wound appears clean and superficial.  Followed by Grafix prime graft was applied to the patient.  I did fashion 5cm x 5 cm and  cover the open wound to make thick layer of graft.     Application of skin substitute of the Grafix

## 2025-02-10 ENCOUNTER — OFFICE VISIT (OUTPATIENT)
Age: 80
End: 2025-02-10

## 2025-02-10 VITALS
BODY MASS INDEX: 23.85 KG/M2 | RESPIRATION RATE: 18 BRPM | WEIGHT: 121.5 LBS | TEMPERATURE: 97.8 F | OXYGEN SATURATION: 95 % | DIASTOLIC BLOOD PRESSURE: 71 MMHG | HEART RATE: 77 BPM | HEIGHT: 60 IN | SYSTOLIC BLOOD PRESSURE: 102 MMHG

## 2025-02-10 DIAGNOSIS — L97.909 VENOUS ULCER (HCC): Primary | ICD-10-CM

## 2025-02-10 DIAGNOSIS — I83.009 VENOUS ULCER (HCC): Primary | ICD-10-CM

## 2025-02-10 ASSESSMENT — PATIENT HEALTH QUESTIONNAIRE - PHQ9
5. POOR APPETITE OR OVEREATING: NOT AT ALL
SUM OF ALL RESPONSES TO PHQ QUESTIONS 1-9: 5
8. MOVING OR SPEAKING SO SLOWLY THAT OTHER PEOPLE COULD HAVE NOTICED. OR THE OPPOSITE, BEING SO FIGETY OR RESTLESS THAT YOU HAVE BEEN MOVING AROUND A LOT MORE THAN USUAL: SEVERAL DAYS
1. LITTLE INTEREST OR PLEASURE IN DOING THINGS: NOT AT ALL
9. THOUGHTS THAT YOU WOULD BE BETTER OFF DEAD, OR OF HURTING YOURSELF: NOT AT ALL
6. FEELING BAD ABOUT YOURSELF - OR THAT YOU ARE A FAILURE OR HAVE LET YOURSELF OR YOUR FAMILY DOWN: NOT AT ALL
SUM OF ALL RESPONSES TO PHQ QUESTIONS 1-9: 5
7. TROUBLE CONCENTRATING ON THINGS, SUCH AS READING THE NEWSPAPER OR WATCHING TELEVISION: SEVERAL DAYS
3. TROUBLE FALLING OR STAYING ASLEEP: NOT AT ALL
SUM OF ALL RESPONSES TO PHQ QUESTIONS 1-9: 5
SUM OF ALL RESPONSES TO PHQ9 QUESTIONS 1 & 2: 0
2. FEELING DOWN, DEPRESSED OR HOPELESS: NOT AT ALL
10. IF YOU CHECKED OFF ANY PROBLEMS, HOW DIFFICULT HAVE THESE PROBLEMS MADE IT FOR YOU TO DO YOUR WORK, TAKE CARE OF THINGS AT HOME, OR GET ALONG WITH OTHER PEOPLE: NOT DIFFICULT AT ALL
SUM OF ALL RESPONSES TO PHQ QUESTIONS 1-9: 5
4. FEELING TIRED OR HAVING LITTLE ENERGY: NEARLY EVERY DAY

## 2025-02-10 NOTE — PROGRESS NOTES
Identified patient with two patient identifiers (name and ). Reviewed chart in preparation for visit and have obtained necessary documentation.    Brenda Olivas is a 79 y.o. female  Chief Complaint   Patient presents with    Follow-up     graft     BP (!) 84/52 (Site: Right Upper Arm, Position: Sitting, Cuff Size: Small Adult)   Pulse 77   Temp 97.8 °F (36.6 °C) (Oral)   Resp 18   Ht 1.524 m (5')   Wt 55.1 kg (121 lb 8 oz)   SpO2 95%   BMI 23.73 kg/m²     1. Have you been to the ER, urgent care clinic since your last visit?  Hospitalized since your last visit?no    2. Have you seen or consulted any other health care providers outside of the [facility] since your last visit?  Include any pap smears or colon screening. No       
layer.  Followed by applied graft wounds covered with moisturizing gel, followed by petroleum gauze which was fashioned about 5 cm diameter.  Followed by 2 x 2 gauze and wounds are covered with a Kerlix roll and Ace wrap.  Patient tolerated procedure well         **Note: This kenny is pertinent to patient with PAD.    Vascular risk factor modification regimen:  5 regimens include: Optimal cholesterol control, exercise program, medication modifications including antiplatelet therapy and antihypertensives, optimal blood pressure control, and optimal hydration.  6 regimens include: 5 regimens plus tobacco cessation.  7 regimens include: 5 regimens plus strict diabetic control.  8 regimens include: 6 plus 7 regimens    Kemar Beyer MD

## 2025-02-17 ENCOUNTER — OFFICE VISIT (OUTPATIENT)
Age: 80
End: 2025-02-17

## 2025-02-17 VITALS
HEIGHT: 60 IN | TEMPERATURE: 98.2 F | RESPIRATION RATE: 16 BRPM | SYSTOLIC BLOOD PRESSURE: 128 MMHG | OXYGEN SATURATION: 96 % | DIASTOLIC BLOOD PRESSURE: 84 MMHG | HEART RATE: 80 BPM | BODY MASS INDEX: 23.95 KG/M2 | WEIGHT: 122 LBS

## 2025-02-17 DIAGNOSIS — L97.909 VENOUS ULCER (HCC): Primary | ICD-10-CM

## 2025-02-17 DIAGNOSIS — I83.009 VENOUS ULCER (HCC): Primary | ICD-10-CM

## 2025-02-17 ASSESSMENT — PATIENT HEALTH QUESTIONNAIRE - PHQ9
1. LITTLE INTEREST OR PLEASURE IN DOING THINGS: NOT AT ALL
SUM OF ALL RESPONSES TO PHQ9 QUESTIONS 1 & 2: 0
SUM OF ALL RESPONSES TO PHQ QUESTIONS 1-9: 0
2. FEELING DOWN, DEPRESSED OR HOPELESS: NOT AT ALL

## 2025-02-17 NOTE — PROGRESS NOTES
Identified pt with two pt identifiers (name and ). Reviewed chart in preparation for visit and have obtained necessary documentation.    Brenda Olivas is a 80 y.o. female  Chief Complaint   Patient presents with    Follow-up     Wound check graft      /84 (Site: Left Upper Arm, Position: Sitting, Cuff Size: Small Adult)   Pulse 80   Temp 98.2 °F (36.8 °C) (Oral)   Resp 16   Ht 1.524 m (5')   Wt 55.3 kg (122 lb)   SpO2 96%   BMI 23.83 kg/m²     1. Have you been to the ER, urgent care clinic since your last visit?  Hospitalized since your last visit?NO    2. Have you seen or consulted any other health care providers outside of the [facility] since your last visit?  Include any pap smears or colon screening. NO

## 2025-03-06 ENCOUNTER — OFFICE VISIT (OUTPATIENT)
Age: 80
End: 2025-03-06

## 2025-03-06 VITALS
SYSTOLIC BLOOD PRESSURE: 96 MMHG | RESPIRATION RATE: 14 BRPM | OXYGEN SATURATION: 97 % | HEIGHT: 60 IN | HEART RATE: 87 BPM | DIASTOLIC BLOOD PRESSURE: 65 MMHG | WEIGHT: 123 LBS | BODY MASS INDEX: 24.15 KG/M2 | TEMPERATURE: 96.4 F

## 2025-03-06 DIAGNOSIS — L97.909 VENOUS ULCER (HCC): Primary | ICD-10-CM

## 2025-03-06 DIAGNOSIS — I83.009 VENOUS ULCER (HCC): Primary | ICD-10-CM

## 2025-03-06 ASSESSMENT — PATIENT HEALTH QUESTIONNAIRE - PHQ9
5. POOR APPETITE OR OVEREATING: NOT AT ALL
SUM OF ALL RESPONSES TO PHQ QUESTIONS 1-9: 0
2. FEELING DOWN, DEPRESSED OR HOPELESS: NOT AT ALL
10. IF YOU CHECKED OFF ANY PROBLEMS, HOW DIFFICULT HAVE THESE PROBLEMS MADE IT FOR YOU TO DO YOUR WORK, TAKE CARE OF THINGS AT HOME, OR GET ALONG WITH OTHER PEOPLE: NOT DIFFICULT AT ALL
SUM OF ALL RESPONSES TO PHQ QUESTIONS 1-9: 0
4. FEELING TIRED OR HAVING LITTLE ENERGY: NOT AT ALL
7. TROUBLE CONCENTRATING ON THINGS, SUCH AS READING THE NEWSPAPER OR WATCHING TELEVISION: NOT AT ALL
6. FEELING BAD ABOUT YOURSELF - OR THAT YOU ARE A FAILURE OR HAVE LET YOURSELF OR YOUR FAMILY DOWN: NOT AT ALL
3. TROUBLE FALLING OR STAYING ASLEEP: NOT AT ALL
1. LITTLE INTEREST OR PLEASURE IN DOING THINGS: NOT AT ALL
8. MOVING OR SPEAKING SO SLOWLY THAT OTHER PEOPLE COULD HAVE NOTICED. OR THE OPPOSITE, BEING SO FIGETY OR RESTLESS THAT YOU HAVE BEEN MOVING AROUND A LOT MORE THAN USUAL: NOT AT ALL
SUM OF ALL RESPONSES TO PHQ QUESTIONS 1-9: 0
9. THOUGHTS THAT YOU WOULD BE BETTER OFF DEAD, OR OF HURTING YOURSELF: NOT AT ALL
SUM OF ALL RESPONSES TO PHQ QUESTIONS 1-9: 0

## 2025-03-06 NOTE — PROGRESS NOTES
Identified pt with two pt identifiers (name and ). Reviewed chart in preparation for visit and have obtained necessary documentation.     Brenda Olivas is a 80 y.o. female    Chief Complaint   Patient presents with    Follow-up     graft     Ht 1.524 m (5')   BMI 23.83 kg/m²      1. Have you been to the ER, urgent care clinic since your last visit?  Hospitalized since your last visit?no     2. Have you seen or consulted any other health care providers outside of the [facility] since your last visit?  Include any pap smears or colon screening. yes   .

## 2025-05-13 ENCOUNTER — HOSPITAL ENCOUNTER (EMERGENCY)
Facility: HOSPITAL | Age: 80
Discharge: HOME | End: 2025-05-13
Attending: EMERGENCY MEDICINE
Payer: MEDICARE

## 2025-05-13 VITALS
TEMPERATURE: 98 F | SYSTOLIC BLOOD PRESSURE: 131 MMHG | RESPIRATION RATE: 15 BRPM | OXYGEN SATURATION: 100 % | DIASTOLIC BLOOD PRESSURE: 78 MMHG | BODY MASS INDEX: 25.52 KG/M2 | HEART RATE: 66 BPM | WEIGHT: 130 LBS | HEIGHT: 60 IN

## 2025-05-13 DIAGNOSIS — S81.811A LACERATION OF RIGHT LOWER EXTREMITY, INITIAL ENCOUNTER: Primary | ICD-10-CM

## 2025-05-13 PROCEDURE — 6360000002 HC RX W HCPCS: Performed by: EMERGENCY MEDICINE

## 2025-05-13 PROCEDURE — 90714 TD VACC NO PRESV 7 YRS+ IM: CPT | Performed by: EMERGENCY MEDICINE

## 2025-05-13 PROCEDURE — 99282 EMERGENCY DEPT VISIT SF MDM: CPT

## 2025-05-13 PROCEDURE — 90471 IMMUNIZATION ADMIN: CPT | Performed by: EMERGENCY MEDICINE

## 2025-05-13 PROCEDURE — 13121 CMPLX RPR S/A/L 2.6-7.5 CM: CPT

## 2025-05-13 PROCEDURE — 13122 CMPLX RPR S/A/L ADDL 5 CM/>: CPT

## 2025-05-13 RX ORDER — LIDOCAINE HYDROCHLORIDE 10 MG/ML
10 INJECTION, SOLUTION EPIDURAL; INFILTRATION; INTRACAUDAL; PERINEURAL ONCE
Status: COMPLETED | OUTPATIENT
Start: 2025-05-13 | End: 2025-05-13

## 2025-05-13 RX ADMIN — CLOSTRIDIUM TETANI TOXOID ANTIGEN (FORMALDEHYDE INACTIVATED) AND CORYNEBACTERIUM DIPHTHERIAE TOXOID ANTIGEN (FORMALDEHYDE INACTIVATED) 0.5 ML: 5; 2 INJECTION, SUSPENSION INTRAMUSCULAR at 17:32

## 2025-05-13 RX ADMIN — LIDOCAINE HYDROCHLORIDE 10 ML: 10 INJECTION, SOLUTION EPIDURAL; INFILTRATION; INTRACAUDAL; PERINEURAL at 17:32

## 2025-05-13 ASSESSMENT — PAIN DESCRIPTION - ORIENTATION: ORIENTATION: RIGHT;ANTERIOR;LOWER

## 2025-05-13 ASSESSMENT — PAIN SCALES - GENERAL: PAINLEVEL_OUTOF10: 9

## 2025-05-13 ASSESSMENT — PAIN DESCRIPTION - LOCATION: LOCATION: LEG

## 2025-05-13 ASSESSMENT — PAIN - FUNCTIONAL ASSESSMENT: PAIN_FUNCTIONAL_ASSESSMENT: 0-10

## 2025-05-13 ASSESSMENT — PAIN DESCRIPTION - DESCRIPTORS: DESCRIPTORS: THROBBING

## 2025-05-13 NOTE — ED PROVIDER NOTES
[facility] EMERGENCY DEPT  EMERGENCY DEPARTMENT HISTORY AND PHYSICAL EXAM      Date of evaluation: 2025  Patient Name: Brenda Olivas  Birthdate 1945  MRN: [id number]  ED Provider: Jose Zepeda MD   Note Started: 3:54 PM EDT 25    HISTORY OF PRESENT ILLNESS     Chief Complaint   Patient presents with    Laceration       History Provided By: Patient, only     HPI: Brenda Olivas is a 80 y.o. female fell and injured her anterior right shin getting out of a car with a large laceration.  Last tetanus unknown.    PAST MEDICAL HISTORY   Past Medical History:  Past Medical History:   Diagnosis Date    Anxiety     Arthritis     Asthma     CAD (coronary artery disease)     Cancer (HCC)     multiple myeloma, currenly getting chemo po    Depression     Hypertension     Multiple myeloma (HCC)        Past Surgical History:  Past Surgical History:   Procedure Laterality Date     SECTION N/A     GASTRIC BYPASS SURGERY N/A     HYSTEROTOMY N/A     uterus removed    JOINT REPLACEMENT Right 2019    hip    ROTATOR CUFF REPAIR Left     TOTAL KNEE ARTHROPLASTY Right 2010    UPPER GASTROINTESTINAL ENDOSCOPY N/A 2024    EGD BIOPSY performed by Tulio Gamboa MD at [facility] ENDOSCOPY    UPPER GASTROINTESTINAL ENDOSCOPY N/A 10/18/2024    ESOPHAGOGASTRODUODENOSCOPY performed by Jakub Baxter MD at [facility] ENDOSCOPY       Family History:  No family history on file.    Social History:  Social History     Tobacco Use    Smoking status: Some Days     Current packs/day: 0.50     Average packs/day: 0.5 packs/day for 45.4 years (22.7 ttl pk-yrs)     Types: Cigarettes     Start date:     Smokeless tobacco: Never   Vaping Use    Vaping status: Never Used   Substance Use Topics    Alcohol use: Never    Drug use: Yes     Types: Marijuana (Weed)       Allergies:  Allergies   Allergen Reactions    Adhesive Tape Itching     Patient states she is good with this        PCP: Aida Frazier MD    Current Meds:   No current

## 2025-05-13 NOTE — ED PROVIDER NOTES
Lac Repair    Date/Time: 5/13/2025 5:35 PM    Performed by: Khurram Manzo APRN - NP  Authorized by: Jose Zepeda MD    Consent:     Consent obtained:  Verbal    Consent given by:  Patient    Risks discussed:  Infection, pain, poor cosmetic result, need for additional repair, nerve damage, poor wound healing, vascular damage, tendon damage and retained foreign body    Alternatives discussed:  No treatment, delayed treatment, observation and referral  Universal protocol:     Patient identity confirmed:  Verbally with patient  Laceration details:     Location:  Leg    Leg location:  R lower leg    Length (cm):  20    Depth (mm):  2  Pre-procedure details:     Preparation:  Patient was prepped and draped in usual sterile fashion and imaging obtained to evaluate for foreign bodies  Exploration:     Limited defect created (wound extended): no      Hemostasis achieved with:  Direct pressure    Imaging outcome: foreign body not noted      Wound exploration: wound explored through full range of motion and entire depth of wound visualized      Wound extent: areolar tissue violated      Contaminated: no    Treatment:     Area cleansed with:  Povidone-iodine    Amount of cleaning:  Standard    Undermining:  Extensive    Scar revision: no    Skin repair:     Repair method:  Sutures    Suture size:  3-0    Suture material:  Nylon    Suture technique:  Simple interrupted    Number of sutures:  14  Approximation:     Approximation:  Close  Repair type:     Repair type:  Complex  Post-procedure details:     Dressing:  Open (no dressing)    Procedure completion:  Tolerated well, no immediate complications        Khurram Manzo APRN - NP  05/13/25 1738

## 2025-05-13 NOTE — ED TRIAGE NOTES
Pt to triage via wheelchair reporting large laceration to right shin after hitting it on the bottom of car door. Bleeding is controlled

## 2025-05-15 ENCOUNTER — OFFICE VISIT (OUTPATIENT)
Age: 80
End: 2025-05-15
Payer: MEDICARE

## 2025-05-15 VITALS
DIASTOLIC BLOOD PRESSURE: 79 MMHG | RESPIRATION RATE: 19 BRPM | OXYGEN SATURATION: 95 % | TEMPERATURE: 97.8 F | HEART RATE: 75 BPM | HEIGHT: 60 IN | BODY MASS INDEX: 25.39 KG/M2 | SYSTOLIC BLOOD PRESSURE: 122 MMHG

## 2025-05-15 DIAGNOSIS — I83.009 VENOUS ULCER (HCC): Primary | ICD-10-CM

## 2025-05-15 DIAGNOSIS — L97.909 VENOUS ULCER (HCC): Primary | ICD-10-CM

## 2025-05-15 PROCEDURE — G8400 PT W/DXA NO RESULTS DOC: HCPCS | Performed by: SURGERY

## 2025-05-15 PROCEDURE — 99212 OFFICE O/P EST SF 10 MIN: CPT | Performed by: SURGERY

## 2025-05-15 PROCEDURE — 1123F ACP DISCUSS/DSCN MKR DOCD: CPT | Performed by: SURGERY

## 2025-05-15 PROCEDURE — 1126F AMNT PAIN NOTED NONE PRSNT: CPT | Performed by: SURGERY

## 2025-05-15 PROCEDURE — G8427 DOCREV CUR MEDS BY ELIG CLIN: HCPCS | Performed by: SURGERY

## 2025-05-15 PROCEDURE — 3074F SYST BP LT 130 MM HG: CPT | Performed by: SURGERY

## 2025-05-15 PROCEDURE — 4004F PT TOBACCO SCREEN RCVD TLK: CPT | Performed by: SURGERY

## 2025-05-15 PROCEDURE — 3078F DIAST BP <80 MM HG: CPT | Performed by: SURGERY

## 2025-05-15 PROCEDURE — 1090F PRES/ABSN URINE INCON ASSESS: CPT | Performed by: SURGERY

## 2025-05-15 PROCEDURE — G8419 CALC BMI OUT NRM PARAM NOF/U: HCPCS | Performed by: SURGERY

## 2025-05-15 NOTE — PROGRESS NOTES
Identified pt with two pt identifiers (name and ). Reviewed chart in preparation for visit and have obtained necessary documentation.    Brenda Olivas is a 80 y.o. female  Chief Complaint   Patient presents with    Follow-up     Patient hit her leg on the car door      /79 (BP Site: Left Upper Arm, Patient Position: Sitting, BP Cuff Size: Large Adult)   Pulse 75   Temp 97.8 °F (36.6 °C) (Oral)   Resp 19   Ht 1.524 m (5')   SpO2 95%   BMI 25.39 kg/m²     1. Have you been to the ER, urgent care clinic since your last visit?  Hospitalized since your last visit?yes    2. Have you seen or consulted any other health care providers outside of the [facility] since your last visit?  Include any pap smears or colon screening. no

## 2025-05-20 ENCOUNTER — HOSPITAL ENCOUNTER (EMERGENCY)
Facility: HOSPITAL | Age: 80
Discharge: HOME | End: 2025-05-20
Attending: EMERGENCY MEDICINE
Payer: MEDICARE

## 2025-05-20 VITALS
WEIGHT: 123 LBS | BODY MASS INDEX: 24.15 KG/M2 | DIASTOLIC BLOOD PRESSURE: 85 MMHG | OXYGEN SATURATION: 100 % | HEIGHT: 60 IN | HEART RATE: 68 BPM | RESPIRATION RATE: 20 BRPM | SYSTOLIC BLOOD PRESSURE: 122 MMHG | TEMPERATURE: 98.2 F

## 2025-05-20 DIAGNOSIS — Z51.89 VISIT FOR WOUND CHECK: Primary | ICD-10-CM

## 2025-05-20 DIAGNOSIS — N64.4 BREAST PAIN: ICD-10-CM

## 2025-05-20 PROCEDURE — 99283 EMERGENCY DEPT VISIT LOW MDM: CPT

## 2025-05-20 ASSESSMENT — PAIN DESCRIPTION - LOCATION: LOCATION: BREAST

## 2025-05-20 ASSESSMENT — PAIN SCALES - GENERAL: PAINLEVEL_OUTOF10: 4

## 2025-05-20 NOTE — ED TRIAGE NOTES
Hx of multiple myeloma, has been undergoing treatment at [facility]. Over the past couple of days patients left breast has increased two times in size.

## 2025-05-20 NOTE — ED NOTES
Last episode of vomiting 0900, states that she is not nauseous right now that the nausea and pain come and go.

## 2025-05-20 NOTE — ED PROVIDER NOTES
[facility] EMERGENCY DEPT  EMERGENCY DEPARTMENT HISTORY AND PHYSICAL EXAM      Date of evaluation: 2025  Patient Name: Brenda Olivas  Birthdate 1945  MRN: [id number]  ED Provider: Edgar Rasheed MD   Note Started: 4:37 PM EDT 25    HISTORY OF PRESENT ILLNESS     Chief Complaint   Patient presents with    Breast Problem    Suture / Staple Removal       History Provided By: Patient, only     HPI: Brenda Olivas is a 80 y.o. female complaining of left breast pain.  She says she feels like it swollen without any focal area of redness or edema.  She says she would like it looked at.  She just states she would like the wound evaluated in the right lower leg.  She says it was sewed up almost a week ago.  Patient denies any other signs or symptoms.    PAST MEDICAL HISTORY   Past Medical History:  Past Medical History:   Diagnosis Date    Anxiety     Arthritis     Asthma     CAD (coronary artery disease)     Cancer (HCC)     multiple myeloma, currenly getting chemo po    Depression     Hypertension     Multiple myeloma (HCC)        Past Surgical History:  Past Surgical History:   Procedure Laterality Date     SECTION N/A     GASTRIC BYPASS SURGERY N/A     HYSTEROTOMY N/A     uterus removed    JOINT REPLACEMENT Right 2019    hip    ROTATOR CUFF REPAIR Left     TOTAL KNEE ARTHROPLASTY Right 2010    UPPER GASTROINTESTINAL ENDOSCOPY N/A 2024    EGD BIOPSY performed by Tulio Gamboa MD at [facility] ENDOSCOPY    UPPER GASTROINTESTINAL ENDOSCOPY N/A 10/18/2024    ESOPHAGOGASTRODUODENOSCOPY performed by Jakub Baxter MD at [facility] ENDOSCOPY       Family History:  History reviewed. No pertinent family history.    Social History:  Social History     Tobacco Use    Smoking status: Some Days     Current packs/day: 0.50     Average packs/day: 0.5 packs/day for 45.4 years (22.7 ttl pk-yrs)     Types: Cigarettes     Start date:     Smokeless tobacco: Never   Vaping Use    Vaping status: Never Used   Substance

## 2025-05-22 NOTE — PROGRESS NOTES
Vascular History and Physical    Patient: Brenda Olivas  MRN: [id number]    YOB: 1945  Age: 80 y.o.  Sex: female     Chief Complaint:  Chief Complaint   Patient presents with    Follow-up     Patient hit her leg on the car door        History of Present Illness: Brenda Olivas is a 80 y.o. very pleasant woman is here today follow-up recent hospital visit.  Patient has known history of chronic venous hypertension.  Patient fell and sustained a wound on the right leg.    Social History:  Social Connections: Unknown (2022)    Received from [facility] [employer]    Social Connection and Isolation Panel [NHANES]     Frequency of Communication with Friends and Family: More than three times a week     Frequency of Social Gatherings with Friends and Family: More than three times a week     Attends Zoroastrian Services: Not on file     Active Member of Clubs or Organizations: Not on file     Attends Club or Organization Meetings: Not on file     Marital Status:        Past Medical History:  Past Medical History:   Diagnosis Date    Anxiety     Arthritis     Asthma     CAD (coronary artery disease)     Cancer (HCC)     multiple myeloma, currenly getting chemo po    Depression     Hypertension     Multiple myeloma (HCC)        Surgical History:  Past Surgical History:   Procedure Laterality Date     SECTION N/A     GASTRIC BYPASS SURGERY N/A     HYSTEROTOMY N/A     uterus removed    JOINT REPLACEMENT Right 2019    hip    ROTATOR CUFF REPAIR Left     TOTAL KNEE ARTHROPLASTY Right     UPPER GASTROINTESTINAL ENDOSCOPY N/A 2024    EGD BIOPSY performed by Tulio Gamboa MD at [facility] ENDOSCOPY    UPPER GASTROINTESTINAL ENDOSCOPY N/A 10/18/2024    ESOPHAGOGASTRODUODENOSCOPY performed by Jakub Baxter MD at [facility] ENDOSCOPY       Allergies:  Allergies   Allergen Reactions    Adhesive Tape Itching     Patient states she is good with this        Current Meds:  Current Outpatient Medications

## 2025-05-23 ENCOUNTER — OFFICE VISIT (OUTPATIENT)
Age: 80
End: 2025-05-23
Payer: MEDICARE

## 2025-05-23 VITALS
HEIGHT: 60 IN | OXYGEN SATURATION: 93 % | DIASTOLIC BLOOD PRESSURE: 81 MMHG | TEMPERATURE: 98.2 F | HEART RATE: 75 BPM | SYSTOLIC BLOOD PRESSURE: 130 MMHG | RESPIRATION RATE: 16 BRPM | BODY MASS INDEX: 24.02 KG/M2

## 2025-05-23 DIAGNOSIS — I83.009 VENOUS ULCER (HCC): Primary | ICD-10-CM

## 2025-05-23 DIAGNOSIS — L97.909 VENOUS ULCER (HCC): Primary | ICD-10-CM

## 2025-05-23 PROCEDURE — 29581 APPL MULTLAYER CMPRN SYS LEG: CPT | Performed by: SURGERY

## 2025-05-23 PROCEDURE — 4004F PT TOBACCO SCREEN RCVD TLK: CPT | Performed by: SURGERY

## 2025-05-23 PROCEDURE — 99212 OFFICE O/P EST SF 10 MIN: CPT | Performed by: SURGERY

## 2025-05-23 PROCEDURE — G8400 PT W/DXA NO RESULTS DOC: HCPCS | Performed by: SURGERY

## 2025-05-23 PROCEDURE — 1090F PRES/ABSN URINE INCON ASSESS: CPT | Performed by: SURGERY

## 2025-05-23 PROCEDURE — G8420 CALC BMI NORM PARAMETERS: HCPCS | Performed by: SURGERY

## 2025-05-23 PROCEDURE — 3075F SYST BP GE 130 - 139MM HG: CPT | Performed by: SURGERY

## 2025-05-23 PROCEDURE — 3079F DIAST BP 80-89 MM HG: CPT | Performed by: SURGERY

## 2025-05-23 PROCEDURE — G8427 DOCREV CUR MEDS BY ELIG CLIN: HCPCS | Performed by: SURGERY

## 2025-05-23 PROCEDURE — 1125F AMNT PAIN NOTED PAIN PRSNT: CPT | Performed by: SURGERY

## 2025-05-23 PROCEDURE — 1123F ACP DISCUSS/DSCN MKR DOCD: CPT | Performed by: SURGERY

## 2025-05-23 RX ORDER — CLINDAMYCIN HYDROCHLORIDE 300 MG/1
300 CAPSULE ORAL 4 TIMES DAILY
Qty: 40 CAPSULE | Refills: 0 | Status: SHIPPED | OUTPATIENT
Start: 2025-05-23 | End: 2025-06-02

## 2025-05-23 RX ORDER — CIPROFLOXACIN 500 MG/1
500 TABLET, FILM COATED ORAL 2 TIMES DAILY
Qty: 20 TABLET | Refills: 0 | Status: SHIPPED | OUTPATIENT
Start: 2025-05-23 | End: 2025-06-02

## 2025-05-23 NOTE — PROGRESS NOTES
Identified pt with two pt identifiers (name and ). Reviewed chart in preparation for visit and have obtained necessary documentation.    Brenda Olivas is a 80 y.o. female  Chief Complaint   Patient presents with    Follow-up     R    Wound Check     Right Leg      /81 (BP Site: Left Upper Arm, Patient Position: Sitting, BP Cuff Size: Large Adult)   Pulse 75   Temp 98.2 °F (36.8 °C) (Oral)   Resp 16   Ht 1.524 m (5')   SpO2 93%   BMI 24.02 kg/m²     1. Have you been to the ER, urgent care clinic since your last visit?  Hospitalized since your last visit?yes    2. Have you seen or consulted any other health care providers outside of the [facility] since your last visit?  Include any pap smears or colon screening. no

## 2025-05-28 NOTE — PROGRESS NOTES
Vascular History and Physical    Patient: Brenda Olivas  MRN: [id number]    YOB: 1945  Age: 80 y.o.  Sex: female     Chief Complaint:  Chief Complaint   Patient presents with    Follow-up     R    Wound Check     Right Leg        History of Present Illness: Brenda Olivas is a 80 y.o. very pleasant woman is here today recheck wound on right leg venous ulcer.  Patient had an injury in the right leg continue suture in place.  Patient has been doing wound care as instructed.  No fever or chills.    Social History:  Social Connections: Unknown (2022)    Received from [facility] [email or web address], [facility]    Social Connection and Isolation Panel [NHANES]     Frequency of Communication with Friends and Family: More than three times a week     Frequency of Social Gatherings with Friends and Family: More than three times a week     Attends Alevism Services: Not on file     Active Member of Clubs or Organizations: Not on file     Attends Club or Organization Meetings: Not on file     Marital Status:        Past Medical History:  Past Medical History:   Diagnosis Date    Anxiety     Arthritis     Asthma     CAD (coronary artery disease)     Cancer (HCC)     multiple myeloma, currenly getting chemo po    Depression     Hypertension     Multiple myeloma (HCC)        Surgical History:  Past Surgical History:   Procedure Laterality Date     SECTION N/A     GASTRIC BYPASS SURGERY N/A     HYSTEROTOMY N/A     uterus removed    JOINT REPLACEMENT Right 2019    hip    ROTATOR CUFF REPAIR Left     TOTAL KNEE ARTHROPLASTY Right     UPPER GASTROINTESTINAL ENDOSCOPY N/A 2024    EGD BIOPSY performed by Tulio Gamboa MD at [facility] ENDOSCOPY    UPPER GASTROINTESTINAL ENDOSCOPY N/A 10/18/2024    ESOPHAGOGASTRODUODENOSCOPY performed by Jakub Baxter MD at [facility] ENDOSCOPY       Allergies:  Allergies   Allergen Reactions    Adhesive Tape Itching     Patient states she is good with this        Current

## 2025-06-02 ENCOUNTER — OFFICE VISIT (OUTPATIENT)
Age: 80
End: 2025-06-02

## 2025-06-02 VITALS
HEIGHT: 60 IN | BODY MASS INDEX: 24.02 KG/M2 | OXYGEN SATURATION: 92 % | TEMPERATURE: 97.9 F | RESPIRATION RATE: 16 BRPM | SYSTOLIC BLOOD PRESSURE: 95 MMHG | DIASTOLIC BLOOD PRESSURE: 62 MMHG | HEART RATE: 80 BPM

## 2025-06-02 DIAGNOSIS — L97.909 VENOUS ULCER (HCC): Primary | ICD-10-CM

## 2025-06-02 DIAGNOSIS — I83.009 VENOUS ULCER (HCC): Primary | ICD-10-CM

## 2025-06-02 ASSESSMENT — PATIENT HEALTH QUESTIONNAIRE - PHQ9
2. FEELING DOWN, DEPRESSED OR HOPELESS: NOT AT ALL
SUM OF ALL RESPONSES TO PHQ QUESTIONS 1-9: 0
1. LITTLE INTEREST OR PLEASURE IN DOING THINGS: NOT AT ALL
SUM OF ALL RESPONSES TO PHQ QUESTIONS 1-9: 0

## 2025-06-02 NOTE — PROGRESS NOTES
Identified pt with two pt identifiers (name and ). Reviewed chart in preparation for visit and have obtained necessary documentation.    Brenda Olivas is a 80 y.o. female  Chief Complaint   Patient presents with    Follow-up    Wound Check     BP 95/62 (BP Site: Right Upper Arm, Patient Position: Sitting, BP Cuff Size: Large Adult)   Pulse 80   Temp 97.9 °F (36.6 °C) (Oral)   Resp 16   Ht 1.524 m (5')   SpO2 92%   BMI 24.02 kg/m²

## 2025-06-13 ENCOUNTER — OFFICE VISIT (OUTPATIENT)
Age: 80
End: 2025-06-13

## 2025-06-13 VITALS
BODY MASS INDEX: 24.02 KG/M2 | HEIGHT: 60 IN | DIASTOLIC BLOOD PRESSURE: 78 MMHG | HEART RATE: 80 BPM | OXYGEN SATURATION: 99 % | RESPIRATION RATE: 17 BRPM | SYSTOLIC BLOOD PRESSURE: 124 MMHG | TEMPERATURE: 98.2 F

## 2025-06-13 DIAGNOSIS — I83.009 VENOUS ULCER (HCC): Primary | ICD-10-CM

## 2025-06-13 DIAGNOSIS — L97.909 VENOUS ULCER (HCC): Primary | ICD-10-CM

## 2025-06-13 ASSESSMENT — PATIENT HEALTH QUESTIONNAIRE - PHQ9
SUM OF ALL RESPONSES TO PHQ QUESTIONS 1-9: 0
SUM OF ALL RESPONSES TO PHQ QUESTIONS 1-9: 0
2. FEELING DOWN, DEPRESSED OR HOPELESS: NOT AT ALL
SUM OF ALL RESPONSES TO PHQ QUESTIONS 1-9: 0
1. LITTLE INTEREST OR PLEASURE IN DOING THINGS: NOT AT ALL
SUM OF ALL RESPONSES TO PHQ QUESTIONS 1-9: 0

## 2025-06-13 NOTE — PROGRESS NOTES
Identified pt with two pt identifiers (name and ). Reviewed chart in preparation for visit and have obtained necessary documentation.    Brenda Olivas is a 80 y.o. female  Chief Complaint   Patient presents with    Follow-up    Wound Check     Bilateral legs      /78 (BP Site: Left Upper Arm, Patient Position: Sitting, BP Cuff Size: Large Adult)   Pulse 80   Temp 98.2 °F (36.8 °C) (Temporal)   Resp 17   Ht 1.524 m (5')   SpO2 99%   BMI 24.02 kg/m²     1. Have you been to the ER, urgent care clinic since your last visit?  Hospitalized since your last visit?NO    2. Have you seen or consulted any other health care providers outside of the [facility] since your last visit?  Include any pap smears or colon screening. NO

## 2025-06-20 NOTE — PROGRESS NOTES
Vascular History and Physical    Patient: Brenda Olivas  MRN: [id number]    YOB: 1945  Age: 80 y.o.  Sex: female     Chief Complaint:  Chief Complaint   Patient presents with    Follow-up    Wound Check     Bilateral legs        History of Present Illness: Brenda Olivas is a 80 y.o. very pleasant woman is here today for right leg venous ultrasound examination.  Pain is minimal, no fever.  Wound care was performed as instructed.    Social History:  Social Connections: Unknown (2022)    Received from [facility] [employer], [facility]    Social Connection and Isolation Panel [NHANES]     Frequency of Communication with Friends and Family: More than three times a week     Frequency of Social Gatherings with Friends and Family: More than three times a week     Attends Yarsani Services: Not on file     Active Member of Clubs or Organizations: Not on file     Attends Club or Organization Meetings: Not on file     Marital Status:        Past Medical History:  Past Medical History:   Diagnosis Date    Anxiety     Arthritis     Asthma     CAD (coronary artery disease)     Cancer (HCC)     multiple myeloma, currenly getting chemo po    Depression     Hypertension     Multiple myeloma (HCC)        Surgical History:  Past Surgical History:   Procedure Laterality Date     SECTION N/A     GASTRIC BYPASS SURGERY N/A     HYSTEROTOMY N/A     uterus removed    JOINT REPLACEMENT Right 2019    hip    ROTATOR CUFF REPAIR Left     TOTAL KNEE ARTHROPLASTY Right 2010    UPPER GASTROINTESTINAL ENDOSCOPY N/A 2024    EGD BIOPSY performed by Tulio Gamboa MD at [facility] ENDOSCOPY    UPPER GASTROINTESTINAL ENDOSCOPY N/A 10/18/2024    ESOPHAGOGASTRODUODENOSCOPY performed by Jakub Baxter MD at [facility] ENDOSCOPY       Allergies:  Allergies   Allergen Reactions    Adhesive Tape Itching     Patient states she is good with this        Current Meds:  Current Outpatient Medications   Medication Sig Dispense Refill

## 2025-07-10 ENCOUNTER — OFFICE VISIT (OUTPATIENT)
Age: 80
End: 2025-07-10

## 2025-07-10 VITALS
SYSTOLIC BLOOD PRESSURE: 124 MMHG | DIASTOLIC BLOOD PRESSURE: 86 MMHG | BODY MASS INDEX: 24.15 KG/M2 | RESPIRATION RATE: 16 BRPM | HEART RATE: 81 BPM | OXYGEN SATURATION: 95 % | TEMPERATURE: 98.2 F | HEIGHT: 60 IN | WEIGHT: 123 LBS

## 2025-07-10 DIAGNOSIS — I83.009 VENOUS ULCER (HCC): Primary | ICD-10-CM

## 2025-07-10 DIAGNOSIS — L97.909 VENOUS ULCER (HCC): Primary | ICD-10-CM

## 2025-07-10 ASSESSMENT — PATIENT HEALTH QUESTIONNAIRE - PHQ9
SUM OF ALL RESPONSES TO PHQ QUESTIONS 1-9: 2
1. LITTLE INTEREST OR PLEASURE IN DOING THINGS: SEVERAL DAYS
2. FEELING DOWN, DEPRESSED OR HOPELESS: SEVERAL DAYS

## 2025-07-10 NOTE — PROGRESS NOTES
Identified pt with two pt identifiers (name and ). Reviewed chart in preparation for visit and have obtained necessary documentation.    Brenda Olivas is a 80 y.o. female  Chief Complaint   Patient presents with    Follow-up     2 week follow up     /86 (BP Site: Right Upper Arm, Patient Position: Sitting, BP Cuff Size: Small Adult)   Pulse 81   Temp 98.2 °F (36.8 °C) (Oral)   Resp 16   Ht 1.524 m (5')   Wt 55.8 kg (123 lb)   SpO2 95%   BMI 24.02 kg/m²     1. Have you been to the ER, urgent care clinic since your last visit?  Hospitalized since your last visit?no    2. Have you seen or consulted any other health care providers outside of the [facility] since your last visit?  Include any pap smears or colon screening. no

## 2025-07-17 NOTE — PROGRESS NOTES
Vascular History and Physical    Patient: Brenda Olivas  MRN: [id number]    YOB: 1945  Age: 80 y.o.  Sex: female     Chief Complaint:  Chief Complaint   Patient presents with    Follow-up     2 week follow up       History of Present Illness: Brenda Olivas is a 80 y.o. very pleasant woman is here today to reassess venous ulcer right leg.  Patient has been providing wound care with every other day with modified compression wrap.    Social History:  Social Connections: Unknown (2022)    Received from [facility], [facility]    Social Connection and Isolation Panel [NHANES]     Frequency of Communication with Friends and Family: More than three times a week     Frequency of Social Gatherings with Friends and Family: More than three times a week     Attends Rastafarian Services: Not on file     Active Member of Clubs or Organizations: Not on file     Attends Club or Organization Meetings: Not on file     Marital Status:        Past Medical History:  Past Medical History:   Diagnosis Date    Anxiety     Arthritis     Asthma     CAD (coronary artery disease)     Cancer (HCC)     multiple myeloma, currenly getting chemo po    Depression     Hypertension     Multiple myeloma (HCC)        Surgical History:  Past Surgical History:   Procedure Laterality Date     SECTION N/A     GASTRIC BYPASS SURGERY N/A     HYSTEROTOMY N/A     uterus removed    JOINT REPLACEMENT Right 2019    hip    ROTATOR CUFF REPAIR Left     TOTAL KNEE ARTHROPLASTY Right 2010    UPPER GASTROINTESTINAL ENDOSCOPY N/A 2024    EGD BIOPSY performed by Tulio Gamboa MD at [facility] ENDOSCOPY    UPPER GASTROINTESTINAL ENDOSCOPY N/A 10/18/2024    ESOPHAGOGASTRODUODENOSCOPY performed by Jakub Baxter MD at [facility] ENDOSCOPY       Allergies:  Allergies   Allergen Reactions    Adhesive Tape Itching     Patient states she is good with this        Current Meds:  Current Outpatient Medications   Medication Sig Dispense Refill

## 2025-07-21 ENCOUNTER — HOSPITAL ENCOUNTER (EMERGENCY)
Facility: HOSPITAL | Age: 80
Discharge: HOME | End: 2025-07-21
Attending: FAMILY MEDICINE
Payer: MEDICARE

## 2025-07-21 ENCOUNTER — APPOINTMENT (OUTPATIENT)
Facility: HOSPITAL | Age: 80
End: 2025-07-21
Payer: MEDICARE

## 2025-07-21 VITALS
HEIGHT: 63 IN | OXYGEN SATURATION: 97 % | RESPIRATION RATE: 16 BRPM | HEART RATE: 95 BPM | TEMPERATURE: 97 F | WEIGHT: 123 LBS | BODY MASS INDEX: 21.79 KG/M2 | DIASTOLIC BLOOD PRESSURE: 86 MMHG | SYSTOLIC BLOOD PRESSURE: 151 MMHG

## 2025-07-21 DIAGNOSIS — R53.83 OTHER FATIGUE: Primary | ICD-10-CM

## 2025-07-21 DIAGNOSIS — C90.00 MULTIPLE MYELOMA NOT HAVING ACHIEVED REMISSION (HCC): ICD-10-CM

## 2025-07-21 DIAGNOSIS — T45.1X5A CHEMOTHERAPY-INDUCED FATIGUE: ICD-10-CM

## 2025-07-21 DIAGNOSIS — R53.1 GENERAL WEAKNESS: ICD-10-CM

## 2025-07-21 DIAGNOSIS — R53.83 CHEMOTHERAPY-INDUCED FATIGUE: ICD-10-CM

## 2025-07-21 LAB
ALBUMIN SERPL-MCNC: 2 G/DL (ref 3.5–5)
ALBUMIN/GLOB SERPL: 0.7 (ref 1.1–2.2)
ALP SERPL-CCNC: 91 U/L (ref 45–117)
ALT SERPL-CCNC: 12 U/L (ref 12–78)
ANION GAP SERPL CALC-SCNC: 8 MMOL/L (ref 2–12)
AST SERPL W P-5'-P-CCNC: 12 U/L (ref 15–37)
BASOPHILS # BLD: 0.01 K/UL (ref 0–0.1)
BASOPHILS NFR BLD: 0.2 % (ref 0–1)
BILIRUB SERPL-MCNC: 0.4 MG/DL (ref 0.2–1)
BUN SERPL-MCNC: 31 MG/DL (ref 6–20)
BUN/CREAT SERPL: 16 (ref 12–20)
CA-I BLD-MCNC: 7.9 MG/DL (ref 8.5–10.1)
CHLORIDE SERPL-SCNC: 111 MMOL/L (ref 97–108)
CO2 SERPL-SCNC: 21 MMOL/L (ref 21–32)
CREAT SERPL-MCNC: 1.9 MG/DL (ref 0.55–1.02)
DIFFERENTIAL METHOD BLD: ABNORMAL
EOSINOPHIL # BLD: 0.01 K/UL (ref 0–0.4)
EOSINOPHIL NFR BLD: 0.2 % (ref 0–7)
ERYTHROCYTE [DISTWIDTH] IN BLOOD BY AUTOMATED COUNT: 16.8 % (ref 11.5–14.5)
GLOBULIN SER CALC-MCNC: 2.8 G/DL (ref 2–4)
GLUCOSE SERPL-MCNC: 79 MG/DL (ref 65–100)
HCT VFR BLD AUTO: 29.8 % (ref 35–47)
HGB BLD-MCNC: 9.7 G/DL (ref 11.5–16)
IMM GRANULOCYTES # BLD AUTO: 0.01 K/UL (ref 0–0.04)
IMM GRANULOCYTES NFR BLD AUTO: 0.2 % (ref 0–0.5)
INR PPP: 1.5 (ref 0.9–1.1)
LYMPHOCYTES # BLD: 1.07 K/UL (ref 0.8–3.5)
LYMPHOCYTES NFR BLD: 24.2 % (ref 12–49)
MCH RBC QN AUTO: 33.8 PG (ref 26–34)
MCHC RBC AUTO-ENTMCNC: 32.6 G/DL (ref 30–36.5)
MCV RBC AUTO: 103.8 FL (ref 80–99)
MONOCYTES # BLD: 0.37 K/UL (ref 0–1)
MONOCYTES NFR BLD: 8.4 % (ref 5–13)
NEUTS SEG # BLD: 2.95 K/UL (ref 1.8–8)
NEUTS SEG NFR BLD: 66.8 % (ref 32–75)
NRBC # BLD: 0 K/UL (ref 0–0.01)
NRBC BLD-RTO: 0 PER 100 WBC
PLATELET # BLD AUTO: 231 K/UL (ref 150–400)
PMV BLD AUTO: 9.9 FL (ref 8.9–12.9)
POTASSIUM SERPL-SCNC: 4.4 MMOL/L (ref 3.5–5.1)
PROT SERPL-MCNC: 4.8 G/DL (ref 6.4–8.2)
PROTHROMBIN TIME: 17 SEC (ref 11.9–14.1)
RBC # BLD AUTO: 2.87 M/UL (ref 3.8–5.2)
SODIUM SERPL-SCNC: 140 MMOL/L (ref 136–145)
WBC # BLD AUTO: 4.4 K/UL (ref 3.6–11)

## 2025-07-21 PROCEDURE — 85025 COMPLETE CBC W/AUTO DIFF WBC: CPT

## 2025-07-21 PROCEDURE — 85610 PROTHROMBIN TIME: CPT

## 2025-07-21 PROCEDURE — 99284 EMERGENCY DEPT VISIT MOD MDM: CPT

## 2025-07-21 PROCEDURE — 36415 COLL VENOUS BLD VENIPUNCTURE: CPT

## 2025-07-21 PROCEDURE — 80053 COMPREHEN METABOLIC PANEL: CPT

## 2025-07-21 PROCEDURE — 73560 X-RAY EXAM OF KNEE 1 OR 2: CPT

## 2025-07-21 ASSESSMENT — PAIN SCALES - GENERAL
PAINLEVEL_OUTOF10: 5
PAINLEVEL_OUTOF10: 5
PAINLEVEL_OUTOF10: 7

## 2025-07-21 ASSESSMENT — PAIN - FUNCTIONAL ASSESSMENT: PAIN_FUNCTIONAL_ASSESSMENT: 0-10

## 2025-07-21 NOTE — ED NOTES
Patient had bowel movement, loose.  When patient was being cleaned a small bed sore was noticed on left inner butt cheek.  She complained off burning when having bowel movements and it touched her skin.  Skin was cleaned, ointment from orange and white tube applied along with a small mepleplex bandage to the sore.  Patient said she felt a lot better.  A Pure Wick was applied to patient as well.  Patient turned on her left side and pillows put under her to help with comfort and to keep her off of her back side. Warm blanket given, no other needs at this time.

## 2025-07-21 NOTE — ED TRIAGE NOTES
Pt arrives with complaint of ongoing general weakness and fall this morning. Pt states she fell onto right nee with swelling this am. Did not strike head. Pt is on eliquis. Pt does chemo on every Wednesday at [facility] [place] for multiple myeloma.

## 2025-07-22 NOTE — ED PROVIDER NOTES
on file   Depression: Not at risk (7/10/2025)    PHQ-2     PHQ-2 Score: 2   Housing Stability: Low Risk  (10/17/2024)    Housing Stability Vital Sign     Unable to Pay for Housing in the Last Year: No     Number of Times Moved in the Last Year: 1     Homeless in the Last Year: No   Interpersonal Safety: Not At Risk (7/21/2025)    Interpersonal Safety Domain Source: IP Abuse Screening     Physical abuse: Denies     Verbal abuse: Denies     Emotional abuse: Denies     Financial abuse: Denies     Sexual abuse: Denies   Utilities: Not At Risk (10/17/2024)    [facility] Utilities     Threatened with loss of utilities: No       PHYSICAL EXAM   Physical Exam  Vitals and nursing note reviewed.   Constitutional:       Comments: Patient is thin with some physical deconditioning   HENT:      Nose: Nose normal.   Eyes:      Pupils: Pupils are equal, round, and reactive to light.   Pulmonary:      Effort: Pulmonary effort is normal.   Abdominal:      General: Abdomen is flat.      Palpations: Abdomen is soft.   Musculoskeletal:         General: Normal range of motion.      Cervical back: Normal range of motion.   Skin:     General: Skin is warm.      Capillary Refill: Capillary refill takes less than 2 seconds.   Neurological:      General: No focal deficit present.      Mental Status: She is alert.   Psychiatric:         Mood and Affect: Mood normal.         SCREENINGS                No data recorded    LAB, EKG AND DIAGNOSTIC RESULTS   Labs:  No results found for this or any previous visit (from the past 12 hours).    EKG    Radiologic Studies:  Non-plain film images such as CT, Ultrasound and MRI are read by the radiologist. Plain radiographic images are visualized and preliminarily interpreted by the ED Provider with the following findings:     Interpretation per the Radiologist below, if available at the time of this note:  XR KNEE RIGHT (1-2 VIEWS)   Final Result   No acute abnormality.      Electronically signed by Dariel Verduzco

## 2025-07-28 ENCOUNTER — OFFICE VISIT (OUTPATIENT)
Age: 80
End: 2025-07-28
Payer: MEDICARE

## 2025-07-28 VITALS
BODY MASS INDEX: 21.79 KG/M2 | OXYGEN SATURATION: 97 % | TEMPERATURE: 98.7 F | HEIGHT: 63 IN | SYSTOLIC BLOOD PRESSURE: 107 MMHG | DIASTOLIC BLOOD PRESSURE: 74 MMHG | RESPIRATION RATE: 18 BRPM | HEART RATE: 81 BPM

## 2025-07-28 DIAGNOSIS — I83.009 VENOUS ULCER (HCC): Primary | ICD-10-CM

## 2025-07-28 DIAGNOSIS — L97.909 VENOUS ULCER (HCC): Primary | ICD-10-CM

## 2025-07-28 PROCEDURE — 4004F PT TOBACCO SCREEN RCVD TLK: CPT | Performed by: SURGERY

## 2025-07-28 PROCEDURE — G8427 DOCREV CUR MEDS BY ELIG CLIN: HCPCS | Performed by: SURGERY

## 2025-07-28 PROCEDURE — G8420 CALC BMI NORM PARAMETERS: HCPCS | Performed by: SURGERY

## 2025-07-28 PROCEDURE — 29581 APPL MULTLAYER CMPRN SYS LEG: CPT | Performed by: SURGERY

## 2025-07-28 PROCEDURE — 3078F DIAST BP <80 MM HG: CPT | Performed by: SURGERY

## 2025-07-28 PROCEDURE — 1090F PRES/ABSN URINE INCON ASSESS: CPT | Performed by: SURGERY

## 2025-07-28 PROCEDURE — 99212 OFFICE O/P EST SF 10 MIN: CPT | Performed by: SURGERY

## 2025-07-28 PROCEDURE — 1123F ACP DISCUSS/DSCN MKR DOCD: CPT | Performed by: SURGERY

## 2025-07-28 PROCEDURE — 3074F SYST BP LT 130 MM HG: CPT | Performed by: SURGERY

## 2025-07-28 PROCEDURE — G8400 PT W/DXA NO RESULTS DOC: HCPCS | Performed by: SURGERY

## 2025-07-28 PROCEDURE — 1125F AMNT PAIN NOTED PAIN PRSNT: CPT | Performed by: SURGERY

## 2025-07-28 ASSESSMENT — PATIENT HEALTH QUESTIONNAIRE - PHQ9
1. LITTLE INTEREST OR PLEASURE IN DOING THINGS: NOT AT ALL
SUM OF ALL RESPONSES TO PHQ QUESTIONS 1-9: 0
SUM OF ALL RESPONSES TO PHQ QUESTIONS 1-9: 0
2. FEELING DOWN, DEPRESSED OR HOPELESS: NOT AT ALL
SUM OF ALL RESPONSES TO PHQ QUESTIONS 1-9: 0
SUM OF ALL RESPONSES TO PHQ QUESTIONS 1-9: 0

## 2025-08-11 ENCOUNTER — OFFICE VISIT (OUTPATIENT)
Age: 80
End: 2025-08-11
Payer: MEDICARE

## 2025-08-11 VITALS
DIASTOLIC BLOOD PRESSURE: 70 MMHG | HEIGHT: 63 IN | HEART RATE: 89 BPM | RESPIRATION RATE: 20 BRPM | OXYGEN SATURATION: 98 % | TEMPERATURE: 97.2 F | SYSTOLIC BLOOD PRESSURE: 122 MMHG | BODY MASS INDEX: 21.79 KG/M2

## 2025-08-11 DIAGNOSIS — L97.909 VENOUS ULCER (HCC): Primary | ICD-10-CM

## 2025-08-11 DIAGNOSIS — I83.009 VENOUS ULCER (HCC): Primary | ICD-10-CM

## 2025-08-11 PROCEDURE — 3078F DIAST BP <80 MM HG: CPT | Performed by: SURGERY

## 2025-08-11 PROCEDURE — 99212 OFFICE O/P EST SF 10 MIN: CPT | Performed by: SURGERY

## 2025-08-11 PROCEDURE — 4004F PT TOBACCO SCREEN RCVD TLK: CPT | Performed by: SURGERY

## 2025-08-11 PROCEDURE — G8427 DOCREV CUR MEDS BY ELIG CLIN: HCPCS | Performed by: SURGERY

## 2025-08-11 PROCEDURE — G8420 CALC BMI NORM PARAMETERS: HCPCS | Performed by: SURGERY

## 2025-08-11 PROCEDURE — G8400 PT W/DXA NO RESULTS DOC: HCPCS | Performed by: SURGERY

## 2025-08-11 PROCEDURE — 1090F PRES/ABSN URINE INCON ASSESS: CPT | Performed by: SURGERY

## 2025-08-11 PROCEDURE — 3074F SYST BP LT 130 MM HG: CPT | Performed by: SURGERY

## 2025-08-11 PROCEDURE — 1123F ACP DISCUSS/DSCN MKR DOCD: CPT | Performed by: SURGERY

## 2025-08-11 ASSESSMENT — PATIENT HEALTH QUESTIONNAIRE - PHQ9
2. FEELING DOWN, DEPRESSED OR HOPELESS: NOT AT ALL
SUM OF ALL RESPONSES TO PHQ QUESTIONS 1-9: 0
SUM OF ALL RESPONSES TO PHQ QUESTIONS 1-9: 0
1. LITTLE INTEREST OR PLEASURE IN DOING THINGS: NOT AT ALL
SUM OF ALL RESPONSES TO PHQ QUESTIONS 1-9: 0
SUM OF ALL RESPONSES TO PHQ QUESTIONS 1-9: 0

## 2025-08-18 ENCOUNTER — APPOINTMENT (OUTPATIENT)
Facility: HOSPITAL | Age: 80
End: 2025-08-18
Payer: MEDICARE

## 2025-08-18 ENCOUNTER — HOSPITAL ENCOUNTER (EMERGENCY)
Facility: HOSPITAL | Age: 80
Discharge: HOME | End: 2025-08-18
Attending: FAMILY MEDICINE
Payer: MEDICARE

## 2025-08-18 VITALS
RESPIRATION RATE: 19 BRPM | TEMPERATURE: 97.1 F | DIASTOLIC BLOOD PRESSURE: 69 MMHG | HEART RATE: 104 BPM | HEIGHT: 63 IN | OXYGEN SATURATION: 97 % | WEIGHT: 110 LBS | SYSTOLIC BLOOD PRESSURE: 93 MMHG | BODY MASS INDEX: 19.49 KG/M2

## 2025-08-18 DIAGNOSIS — R53.1 GENERAL WEAKNESS: Primary | ICD-10-CM

## 2025-08-18 DIAGNOSIS — E86.0 DEHYDRATION: ICD-10-CM

## 2025-08-18 LAB
BASOPHILS # BLD: 0.01 K/UL (ref 0–0.1)
BASOPHILS NFR BLD: 0.5 % (ref 0–1)
DIFFERENTIAL METHOD BLD: ABNORMAL
EOSINOPHIL # BLD: 0 K/UL (ref 0–0.4)
EOSINOPHIL NFR BLD: 0 % (ref 0–7)
ERYTHROCYTE [DISTWIDTH] IN BLOOD BY AUTOMATED COUNT: 16.1 % (ref 11.5–14.5)
HCT VFR BLD AUTO: 29.1 % (ref 35–47)
HGB BLD-MCNC: 9.6 G/DL (ref 11.5–16)
IMM GRANULOCYTES # BLD AUTO: 0.01 K/UL (ref 0–0.04)
IMM GRANULOCYTES NFR BLD AUTO: 0.5 % (ref 0–0.5)
LYMPHOCYTES # BLD: 0.58 K/UL (ref 0.8–3.5)
LYMPHOCYTES NFR BLD: 27.5 % (ref 12–49)
MCH RBC QN AUTO: 32.5 PG (ref 26–34)
MCHC RBC AUTO-ENTMCNC: 33 G/DL (ref 30–36.5)
MCV RBC AUTO: 98.6 FL (ref 80–99)
MONOCYTES # BLD: 0.07 K/UL (ref 0–1)
MONOCYTES NFR BLD: 3.3 % (ref 5–13)
NEUTS SEG # BLD: 1.44 K/UL (ref 1.8–8)
NEUTS SEG NFR BLD: 68.2 % (ref 32–75)
NRBC # BLD: 0 K/UL (ref 0–0.01)
NRBC BLD-RTO: 0 PER 100 WBC
PLATELET # BLD AUTO: 173 K/UL (ref 150–400)
PMV BLD AUTO: 11.2 FL (ref 8.9–12.9)
RBC # BLD AUTO: 2.95 M/UL (ref 3.8–5.2)
WBC # BLD AUTO: 2.1 K/UL (ref 3.6–11)

## 2025-08-18 PROCEDURE — 36415 COLL VENOUS BLD VENIPUNCTURE: CPT

## 2025-08-18 PROCEDURE — 85025 COMPLETE CBC W/AUTO DIFF WBC: CPT

## 2025-08-18 PROCEDURE — 93005 ELECTROCARDIOGRAM TRACING: CPT | Performed by: FAMILY MEDICINE

## 2025-08-18 PROCEDURE — 99285 EMERGENCY DEPT VISIT HI MDM: CPT

## 2025-08-18 PROCEDURE — 6360000002 HC RX W HCPCS: Performed by: FAMILY MEDICINE

## 2025-08-18 PROCEDURE — 96372 THER/PROPH/DIAG INJ SC/IM: CPT

## 2025-08-18 PROCEDURE — 71045 X-RAY EXAM CHEST 1 VIEW: CPT

## 2025-08-18 PROCEDURE — 6370000000 HC RX 637 (ALT 250 FOR IP): Performed by: FAMILY MEDICINE

## 2025-08-18 RX ORDER — ONDANSETRON 4 MG/1
4 TABLET, ORALLY DISINTEGRATING ORAL EVERY 8 HOURS PRN
Status: DISCONTINUED | OUTPATIENT
Start: 2025-08-18 | End: 2025-08-18 | Stop reason: HOSPADM

## 2025-08-18 RX ORDER — 0.9 % SODIUM CHLORIDE 0.9 %
500 INTRAVENOUS SOLUTION INTRAVENOUS ONCE
Status: DISCONTINUED | OUTPATIENT
Start: 2025-08-18 | End: 2025-08-18

## 2025-08-18 RX ORDER — MORPHINE SULFATE 4 MG/ML
4 INJECTION, SOLUTION INTRAMUSCULAR; INTRAVENOUS
Status: COMPLETED | OUTPATIENT
Start: 2025-08-18 | End: 2025-08-18

## 2025-08-18 RX ORDER — ONDANSETRON 4 MG/1
4 TABLET, ORALLY DISINTEGRATING ORAL 3 TIMES DAILY PRN
Qty: 12 TABLET | Refills: 0 | Status: SHIPPED | OUTPATIENT
Start: 2025-08-18

## 2025-08-18 RX ORDER — HYDROCODONE BITARTRATE AND ACETAMINOPHEN 7.5; 325 MG/15ML; MG/15ML
2.5 SOLUTION ORAL 2 TIMES DAILY PRN
Qty: 35 ML | Refills: 0 | Status: SHIPPED | OUTPATIENT
Start: 2025-08-18 | End: 2025-08-25

## 2025-08-18 RX ADMIN — ONDANSETRON 4 MG: 4 TABLET, ORALLY DISINTEGRATING ORAL at 18:40

## 2025-08-18 RX ADMIN — MORPHINE SULFATE 4 MG: 4 INJECTION, SOLUTION INTRAMUSCULAR; INTRAVENOUS at 18:21

## 2025-08-18 ASSESSMENT — PAIN SCALES - GENERAL
PAINLEVEL_OUTOF10: 10
PAINLEVEL_OUTOF10: 8

## 2025-08-18 ASSESSMENT — PAIN - FUNCTIONAL ASSESSMENT
PAIN_FUNCTIONAL_ASSESSMENT: 0-10
PAIN_FUNCTIONAL_ASSESSMENT: 0-10

## 2025-08-19 LAB
EKG ATRIAL RATE: 112 BPM
EKG DIAGNOSIS: NORMAL
EKG P AXIS: 34 DEGREES
EKG P-R INTERVAL: 136 MS
EKG Q-T INTERVAL: 338 MS
EKG QRS DURATION: 80 MS
EKG QTC CALCULATION (BAZETT): 472 MS
EKG R AXIS: -46 DEGREES
EKG T AXIS: 32 DEGREES
EKG VENTRICULAR RATE: 117 BPM

## (undated) DEVICE — SOLIDIFIER FLD 2OZ 1500CC N DISINF IN BTL DISP SAFESORB

## (undated) DEVICE — IV STRT KT 3282] LSL INDUSTRIES INC]

## (undated) DEVICE — BLUNTFILL: Brand: MONOJECT

## (undated) DEVICE — KIT COLON W/ 1.1OZ LUB AND 2 END

## (undated) DEVICE — CATHETER IV 22GA L1IN OD0.8382-0.9144MM ID0.6096-0.6858MM 382523

## (undated) DEVICE — BITEBLOCK ENDOSCP 60FR MAXI WHT POLYETH STURDY W/ VELC WVN

## (undated) DEVICE — SYRINGE MED 5ML STD CLR PLAS LUERLOCK TIP N CTRL DISP

## (undated) DEVICE — SET ADMIN 16ML TBNG L100IN 2 Y INJ SITE IV PIGGY BK DISP (ORDER IN MULIPLES OF 48)

## (undated) DEVICE — ELECTRODE,RADIOTRANSLUCENT,FOAM,3PK: Brand: MEDLINE

## (undated) DEVICE — CANNULA CUSH AD W/ 14FT TBG

## (undated) DEVICE — 1200 GUARD II KIT W/5MM TUBE W/O VAC TUBE: Brand: GUARDIAN

## (undated) DEVICE — BLUNTFILL WITH FILTER: Brand: MONOJECT

## (undated) DEVICE — SYRINGE MEDICAL 3ML CLEAR PLASTIC STANDARD NON CONTROL LUERLOCK TIP DISPOSABLE